# Patient Record
Sex: FEMALE | Race: BLACK OR AFRICAN AMERICAN | NOT HISPANIC OR LATINO | Employment: UNEMPLOYED | URBAN - METROPOLITAN AREA
[De-identification: names, ages, dates, MRNs, and addresses within clinical notes are randomized per-mention and may not be internally consistent; named-entity substitution may affect disease eponyms.]

---
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
## 2024-11-01 ENCOUNTER — OFFICE VISIT (OUTPATIENT)
Dept: OBSTETRICS AND GYNECOLOGY | Facility: CLINIC | Age: 19
End: 2024-11-01

## 2024-11-01 VITALS
SYSTOLIC BLOOD PRESSURE: 122 MMHG | HEIGHT: 63 IN | BODY MASS INDEX: 21.17 KG/M2 | DIASTOLIC BLOOD PRESSURE: 74 MMHG | WEIGHT: 119.5 LBS

## 2024-11-01 DIAGNOSIS — N91.2 AMENORRHEA: Primary | ICD-10-CM

## 2024-11-01 DIAGNOSIS — O21.9 NAUSEA AND VOMITING DURING PREGNANCY: ICD-10-CM

## 2024-11-01 DIAGNOSIS — Z32.01 POSITIVE PREGNANCY TEST: ICD-10-CM

## 2024-11-01 LAB
B-HCG UR QL: POSITIVE
CTP QC/QA: YES

## 2024-11-01 PROCEDURE — 99999 PR PBB SHADOW E&M-EST. PATIENT-LVL III: CPT | Mod: PBBFAC,,, | Performed by: NURSE PRACTITIONER

## 2024-11-01 PROCEDURE — 99213 OFFICE O/P EST LOW 20 MIN: CPT | Mod: PBBFAC | Performed by: NURSE PRACTITIONER

## 2024-11-01 RX ORDER — PRENATAL WITH FERROUS FUM AND FOLIC ACID 3080; 920; 120; 400; 22; 1.84; 3; 20; 10; 1; 12; 200; 27; 25; 2 [IU]/1; [IU]/1; MG/1; [IU]/1; MG/1; MG/1; MG/1; MG/1; MG/1; MG/1; UG/1; MG/1; MG/1; MG/1; MG/1
1 TABLET ORAL DAILY
Qty: 30 TABLET | Refills: 11 | Status: SHIPPED | OUTPATIENT
Start: 2024-11-01 | End: 2025-11-01

## 2024-11-01 RX ORDER — DOXYLAMINE SUCCINATE AND PYRIDOXINE HYDROCHLORIDE, DELAYED RELEASE TABLETS 10 MG/10 MG 10; 10 MG/1; MG/1
2 TABLET, DELAYED RELEASE ORAL NIGHTLY
Qty: 60 TABLET | Refills: 2 | Status: SHIPPED | OUTPATIENT
Start: 2024-11-01 | End: 2025-01-30

## 2024-11-07 ENCOUNTER — PATIENT MESSAGE (OUTPATIENT)
Dept: OBSTETRICS AND GYNECOLOGY | Facility: CLINIC | Age: 19
End: 2024-11-07

## 2024-11-19 ENCOUNTER — CLINICAL SUPPORT (OUTPATIENT)
Dept: OBSTETRICS AND GYNECOLOGY | Facility: CLINIC | Age: 19
End: 2024-11-19
Payer: MEDICAID

## 2024-11-19 DIAGNOSIS — N91.2 AMENORRHEA: Primary | ICD-10-CM

## 2024-11-19 PROCEDURE — 99212 OFFICE O/P EST SF 10 MIN: CPT | Mod: PBBFAC

## 2024-11-19 PROCEDURE — 99999 PR PBB SHADOW E&M-EST. PATIENT-LVL II: CPT | Mod: PBBFAC,,,

## 2024-12-09 ENCOUNTER — INITIAL PRENATAL (OUTPATIENT)
Dept: OBSTETRICS AND GYNECOLOGY | Facility: CLINIC | Age: 19
End: 2024-12-09
Payer: MEDICAID

## 2024-12-09 ENCOUNTER — HOSPITAL ENCOUNTER (OUTPATIENT)
Dept: PERINATAL CARE | Facility: OTHER | Age: 19
Discharge: HOME | End: 2024-12-09
Attending: OBSTETRICS & GYNECOLOGY
Payer: MEDICAID

## 2024-12-09 VITALS
WEIGHT: 110.44 LBS | SYSTOLIC BLOOD PRESSURE: 122 MMHG | DIASTOLIC BLOOD PRESSURE: 80 MMHG | BODY MASS INDEX: 19.57 KG/M2

## 2024-12-09 DIAGNOSIS — N91.2 AMENORRHEA: Primary | ICD-10-CM

## 2024-12-09 DIAGNOSIS — N91.2 AMENORRHEA: ICD-10-CM

## 2024-12-09 DIAGNOSIS — Z11.3 SCREEN FOR STD (SEXUALLY TRANSMITTED DISEASE): ICD-10-CM

## 2024-12-09 DIAGNOSIS — O21.9 NAUSEA AND VOMITING DURING PREGNANCY: ICD-10-CM

## 2024-12-09 DIAGNOSIS — Z32.01 POSITIVE PREGNANCY TEST: ICD-10-CM

## 2024-12-09 PROCEDURE — 76802 OB US < 14 WKS ADDL FETUS: CPT

## 2024-12-09 PROCEDURE — 76810 OB US >/= 14 WKS ADDL FETUS: CPT

## 2024-12-09 PROCEDURE — 87088 URINE BACTERIA CULTURE: CPT

## 2024-12-09 PROCEDURE — 87491 CHLMYD TRACH DNA AMP PROBE: CPT

## 2024-12-09 PROCEDURE — 76802 OB US < 14 WKS ADDL FETUS: CPT | Mod: 26,,, | Performed by: OBSTETRICS & GYNECOLOGY

## 2024-12-09 PROCEDURE — 99999 PR PBB SHADOW E&M-EST. PATIENT-LVL III: CPT | Mod: PBBFAC,,,

## 2024-12-09 PROCEDURE — 76801 OB US < 14 WKS SINGLE FETUS: CPT

## 2024-12-09 PROCEDURE — 99204 OFFICE O/P NEW MOD 45 MIN: CPT | Mod: TH,S$PBB,,

## 2024-12-09 PROCEDURE — 87086 URINE CULTURE/COLONY COUNT: CPT

## 2024-12-09 PROCEDURE — 99213 OFFICE O/P EST LOW 20 MIN: CPT | Mod: PBBFAC,25,TH

## 2024-12-09 PROCEDURE — 76801 OB US < 14 WKS SINGLE FETUS: CPT | Mod: 26,,, | Performed by: OBSTETRICS & GYNECOLOGY

## 2024-12-09 RX ORDER — DOXYLAMINE SUCCINATE AND PYRIDOXINE HYDROCHLORIDE, DELAYED RELEASE TABLETS 10 MG/10 MG 10; 10 MG/1; MG/1
2 TABLET, DELAYED RELEASE ORAL NIGHTLY
Qty: 60 TABLET | Refills: 2 | Status: SHIPPED | OUTPATIENT
Start: 2024-12-09 | End: 2025-03-09

## 2024-12-09 NOTE — PROGRESS NOTES
CC: Positive Pregnancy Test    HISTORY OF PRESENT ILLNESS:    Amena Theodore is a 19 y.o. female, ,  Presents today for a routine exam complaining of amenorrhea and positive home urine pregnancy test.  Patient's last menstrual period was 2024.  Pt reports menses were normal prior to this.  She is not currently on any contraception. Reports nausea- did not start Diclegis , had problems with insurance. Reports breast tenderness. Denies pelvic pain. This is her first pregnancy. Dating ultrasound today- TWINS!     LMP: 24  EGA: 11w4d (per LMP)  EDC: 25 (per LMP)    ROS:  GENERAL: No weight changes. No swelling. No fatigue. No fever.  CARDIOVASCULAR: No chest pain. No shortness of breath. No leg cramps.   NEUROLOGICAL: No headaches. No vision changes.  BREASTS: No pain. No lumps. No discharge.  ABDOMEN: No pain. No diarrhea. No constipation.  REPRODUCTIVE: No abnormal bleeding.   VULVA: No pain. No lesions. No itching.  VAGINA: No relaxation. No itching. No odor. No discharge. No lesions.  URINARY: No incontinence. No nocturia. No frequency. No dysuria.    MEDICATIONS AND ALLERGIES:  Reviewed    COMPREHENSIVE GYN HISTORY:  PAP History: Denies abnormal Paps.  Infection History: Denies STDs. Denies PID.  Benign History: Denies uterine fibroids. Denies ovarian cysts. Denies endometriosis. Denies other conditions.  Cancer History: Denies cervical cancer. Denies uterine cancer or hyperplasia. Denies ovarian cancer. Denies vulvar cancer or pre-cancer. Denies vaginal cancer or pre-cancer. Denies breast cancer. Denies colon cancer.  Sexual Activity History: Reports currently being sexually active  Menstrual History: None.  Contraception: None    /80   Wt 50.1 kg (110 lb 7.2 oz)   LMP 2024   BMI 19.57 kg/m²     PE:  AFFECT: Calm, alert and oriented X 3. Interactive during exam  GENERAL: Appears well-nourished, well-developed, in no acute distress.  HEAD: Normocephalic, atraumatic  TEETH: Good  dentition.  SKIN: Normal for race, warm, & dry. No lesions or rashes.  LUNGS: Easy and unlabored  HEART: Regular rate and rhythm   EXTREMITIES: No cyanosis, clubbing or edema. No calf tenderness.    PROCEDURES:  UPT Positive  Genprobe  Pap not indicated     ASSESSMENT/PLAN:  Amenorrhea  Positive urine pregnancy test (KARLEE: 25, EGA: 11w4d based on LMP)    -  Routine prenatal care    Nausea and vomiting in pregnancy    -  Education regarding lifestyle and dietary modifications    -  Advised use of B6/Unisom. Pt will notify us if no relief/worsening symptoms    1st TRIMESTER COUNSELING: Discussed all, booklet provided:  Common complaints of pregnancy  HIV and other routine prenatal tests including  genetic screening  Risk factors identified by prenatal history  Oriented to practice - discussed anticipated course of prenatal care & indications for Ultrasound  Childbirth classes/Hospital facilities   Nutrition and weight gain counseling  Toxoplasmosis precautions (Cats/Raw Meat)  Sexual activity and exercise  Environmental/Work hazards  Travel  Tobacco (Ask, Advise, Assess, Assist, and Arrange), as well as alcohol and drug use  Use of any medications (Including supplements, Vitamins, Herbs, or OTC Drugs)  Domestic violence  Seat belt use    TERATOLOGY COUNSELING:   Discussed indications and options for aneuploidy screening - pamphlets given    - Pt will let us know     PLAN:  - Dating ultrasound today, in process   - Urine culture sent  - GC/CT collected  - 1T labs ordered   - Pap not indicated   - Resent Diclegis to [facility] pharmacy     FOLLOW-UP in 4 weeks with Dr. Ranger Jo Ann Casey, NP    OB/GYN

## 2024-12-09 NOTE — PATIENT INSTRUCTIONS
1) Eat small frequent meals through the day versus three large meals  2) Try ginger ale or sprite to help settle the stomach   3) Eat crackers or dry toast before getting out of bed in the morning   4) Stay hydrated by drinking plenty of water-do not immediately eat or drink something after vomiting. Give your stomach a rest for 20-30 minutes. Slowly reintroduce ice chips, small sips water, crackers, etc.    5) Try OTC Unisom (use the tablets that have doxylamine not diphenhydramine in them, can use generic brands like Equate) and vitamin B6  (25 mg, can find on [employer]) together at night before bed. This can help with the nausea in the morning and is safe to use during pregnancy. You can also take the vitamin B6 alone, every 8 hours to help with the nausea.     If you are unable to keep anything down and constantly vomiting for more that 24 hours, let the office know so that dehydration can be avoided. We would recommend being seen in the emergency department if this is the case.      Call [phone] to see about coverage and any out of pocket costs regarding the [id number] (MT21) testing. This is done any day after 10 weeks and is blood work only. It checks for any chromosomal abnormalities like Down Syndrome. You can also find out the sex of the baby if you choose to know. Once you find out coverage and decide to proceed, send either myself or your doctor a message and we can see what date you can do it. It is done at our second floor lab at [facility].      Call clinic [phone] or L & D after hours at [phone]

## 2024-12-10 DIAGNOSIS — O30.039 MONOCHORIONIC DIAMNIOTIC TWIN PREGNANCY, ANTEPARTUM: Primary | ICD-10-CM

## 2024-12-11 ENCOUNTER — TELEPHONE (OUTPATIENT)
Dept: MATERNAL FETAL MEDICINE | Facility: CLINIC | Age: 19
End: 2024-12-11
Payer: MEDICAID

## 2024-12-11 ENCOUNTER — PATIENT MESSAGE (OUTPATIENT)
Dept: MATERNAL FETAL MEDICINE | Facility: CLINIC | Age: 19
End: 2024-12-11
Payer: MEDICAID

## 2024-12-11 LAB — BACTERIA UR CULT: ABNORMAL

## 2024-12-13 LAB
C TRACH DNA SPEC QL NAA+PROBE: NOT DETECTED
N GONORRHOEA DNA SPEC QL NAA+PROBE: NOT DETECTED

## 2024-12-17 ENCOUNTER — PATIENT MESSAGE (OUTPATIENT)
Dept: MATERNAL FETAL MEDICINE | Facility: CLINIC | Age: 19
End: 2024-12-17
Payer: MEDICAID

## 2025-01-14 ENCOUNTER — LAB VISIT (OUTPATIENT)
Dept: LAB | Facility: OTHER | Age: 20
End: 2025-01-14
Attending: STUDENT IN AN ORGANIZED HEALTH CARE EDUCATION/TRAINING PROGRAM
Payer: MEDICAID

## 2025-01-14 ENCOUNTER — ROUTINE PRENATAL (OUTPATIENT)
Dept: OBSTETRICS AND GYNECOLOGY | Facility: CLINIC | Age: 20
End: 2025-01-14
Payer: MEDICAID

## 2025-01-14 VITALS
WEIGHT: 108.69 LBS | DIASTOLIC BLOOD PRESSURE: 80 MMHG | BODY MASS INDEX: 19.25 KG/M2 | SYSTOLIC BLOOD PRESSURE: 126 MMHG

## 2025-01-14 DIAGNOSIS — Z36.9 ENCOUNTER FOR FETAL ULTRASOUND: Primary | ICD-10-CM

## 2025-01-14 DIAGNOSIS — O30.039 MONOCHORIONIC DIAMNIOTIC TWIN PREGNANCY, ANTEPARTUM: Primary | ICD-10-CM

## 2025-01-14 DIAGNOSIS — Z32.01 POSITIVE PREGNANCY TEST: ICD-10-CM

## 2025-01-14 DIAGNOSIS — O30.039 MONOCHORIONIC DIAMNIOTIC TWIN PREGNANCY, ANTEPARTUM: ICD-10-CM

## 2025-01-14 DIAGNOSIS — N91.2 AMENORRHEA: ICD-10-CM

## 2025-01-14 PROCEDURE — 99999 PR PBB SHADOW E&M-EST. PATIENT-LVL III: CPT | Mod: PBBFAC,,, | Performed by: STUDENT IN AN ORGANIZED HEALTH CARE EDUCATION/TRAINING PROGRAM

## 2025-01-14 PROCEDURE — 99213 OFFICE O/P EST LOW 20 MIN: CPT | Mod: TH,S$PBB,, | Performed by: STUDENT IN AN ORGANIZED HEALTH CARE EDUCATION/TRAINING PROGRAM

## 2025-01-14 PROCEDURE — 99213 OFFICE O/P EST LOW 20 MIN: CPT | Mod: PBBFAC,TH | Performed by: STUDENT IN AN ORGANIZED HEALTH CARE EDUCATION/TRAINING PROGRAM

## 2025-01-14 PROCEDURE — 36415 COLL VENOUS BLD VENIPUNCTURE: CPT | Performed by: STUDENT IN AN ORGANIZED HEALTH CARE EDUCATION/TRAINING PROGRAM

## 2025-01-14 RX ORDER — VITAMIN A ACETATE, ASCORBIC ACID, CHOLECALCIFEROL, ALPHA-TOCOPHEROL ACETATE, DL, THIAMINE MONONITRATE, RIBOFLAVIN, NIACINAMIDE, PYRIDOXINE HYDROCHLORIDE, FOLIC ACID, CYANOCOBALAMIN, BIOTIN, CALCIUM PANTOTHENATE, CALCIUM CARBONATE, FERROUS FUMARATE, POTASSIUM IODIDE, MAGNESIUM OXIDE, ZINC OXIDE AND CUPRIC OXIDE 2500; 80; 400; 10; 3; 3.4; 20; 20; 1; 12; 300; 6; 120; 27; 150; 30; 15; 2 [IU]/1; MG/1; [IU]/1; [IU]/1; MG/1; MG/1; MG/1; MG/1; MG/1; UG/1; UG/1; MG/1; MG/1; MG/1; UG/1; UG/1; UG/1; MG/1
1 TABLET, FILM COATED ORAL
COMMUNITY
Start: 2024-11-22 | End: 2025-01-24 | Stop reason: ALTCHOICE

## 2025-01-14 RX ORDER — ASPIRIN 81 MG/1
81 TABLET ORAL DAILY
COMMUNITY
Start: 2025-01-14 | End: 2025-01-24

## 2025-01-14 RX ORDER — PRENATAL WITH FERROUS FUM AND FOLIC ACID 3080; 920; 120; 400; 22; 1.84; 3; 20; 10; 1; 12; 200; 27; 25; 2 [IU]/1; [IU]/1; MG/1; [IU]/1; MG/1; MG/1; MG/1; MG/1; MG/1; MG/1; UG/1; MG/1; MG/1; MG/1; MG/1
1 TABLET ORAL DAILY
Qty: 90 TABLET | Refills: 3 | Status: SHIPPED | OUTPATIENT
Start: 2025-01-14 | End: 2025-01-24 | Stop reason: SDUPTHER

## 2025-01-14 NOTE — PROGRESS NOTES
Pt is here for initial OB. Feeling well today. Nervous.   N/V has improved. No bleeding or pain.  FOB, Quaran, and two of her cousins are here today  MFM has not gotten her to schedule US yet. Will have her turn her blocking feature off. Reviewed with her that she will need frequent US and visits. Also reviewed that CS is a higher likelihood in mono/di twins. Pt is interested in the gender. Wants M21 today for a gender reveal    -Reviewed routine PNC  -Reviewed newmom, connected mom  -Reviewed initial labs, ultrasound  -Reviewed common pregnancy complaints and comfort measures for them  -Genetic testing [id number]  -ASA recommended to start after 12 weeks due to twins  -RTC 4 weeks    Mitzi Nava M.D.

## 2025-01-14 NOTE — PATIENT INSTRUCTIONS
[email or web address]    Nausea and vomiting  Vitamin B6 (pyridoxine) 50-100mg orally with Doxylamine (unisom) 12.5mg orally every 6-8 hours as needed for nausea. If the fatigue is too bad, you can try just the B6. Lastly, if taking it as needed is not helping, consider taking the two together on a scheduled basis.    Medications  Avoid NSAIDs (aleve, motrin, ibuprofen)  Use Tylenol or Acetaminophen for aches/pains, headaches  Begin a baby aspirin once daily (81mg) after 12 weeks -this has been shown to help decrease the chance of blood pressure problems in pregnancy (and twins makes us more prone to this)  Pecid up to twice a day  GasX or simethicone for gas pains  Colace for constipation  Medications that are pregnancy category A, B or C are accepted as safe during pregnancy    Genetic Testing  [id number]: best test we have, most sensitive. Tells gender. Call Toll Free to get survey to decrease cost [phone]. website has a video about the test "[employer]"/videos    Caffiene  Less than 200mg per day    Exercise  Listen to your body  Don't stay with heart rate >140bpm    Weight Gain  -- Recommended weight gain of:          Underweight        Less than 18.5            28-40            Normal Weight     18.5-24.9                    25-35            Overweight          25-29.9                       15-25            Obese                  30 and greater             11-20      Covid  [email or web address]://[email or web address]/[email or web address]-health/faqs/[email or web address]-covid-[email or web address]-[email or web address]-breastfeeding    [email or web address]://[email or web address].org/womens-health/experts-and-stories/the-latest/lmv-la-fo-know-the-covid-19-vaccines-are-safe-and-effective-one-expert-explains    [email or web address]        Dear Amena Theodore,    Congratulations! [facility] is excited for the upcoming addition to your family and is ready to support you over the course of your pregnancy. One of the ways we are  prepared to help you is through our Connected MOM program.     Connected MOM is offered at no charge to help you manage your pregnancy by staying connected with your OB team through digitally connected devices. With Connected MOM, you will be able to digitally send weights and blood pressure readings to your provider and their team from the comfort of work or home without needing to schedule an appointment. Patients who participate in Connected MOM may be able to reduce the number of in-office appointments needed.     To participate, click here to complete the Connected Mom Program Consent questionnaire to start the enrollment process.    Once youve completed the questionnaire, you will be able to select how youd like to obtain your Connected Mom equipment - a digital blood pressure cuff and scale. These can be shipped directly to your home or picked up at an [facility].            If you have any questions about the program, please contact your OB provider or the Connected MOM support team at [phone].    Thank you,  The Connected MOM Team

## 2025-01-16 ENCOUNTER — PATIENT MESSAGE (OUTPATIENT)
Dept: OTHER | Facility: OTHER | Age: 20
End: 2025-01-16
Payer: MEDICAID

## 2025-01-20 ENCOUNTER — PATIENT MESSAGE (OUTPATIENT)
Dept: MATERNAL FETAL MEDICINE | Facility: CLINIC | Age: 20
End: 2025-01-20
Payer: MEDICAID

## 2025-01-23 LAB
ABOUT THE TEST: NORMAL
APPROVED BY: NORMAL
FETAL FRACTION: NORMAL
FETAL SEX RESULT: NORMAL
GESTATIONAL AGE > 9: YES
GESTATIONAL AGE: NORMAL
LAB DIRECTOR COMMENTS: NORMAL
LIMITATIONS:: NORMAL
MONOSOMY X RESULT: NORMAL
NEGATIVE PREDICTIVE VALUE: NORMAL
NOTE: NORMAL
PERFORMANCE CHARACTERISTICS: NORMAL
PERFORMANCE: NORMAL
POSITIVE PREDICTIVE VALUE: NORMAL
RESULT: NEGATIVE
SERVICE CMNT 04-IMP: NORMAL
TEST METHODOLOGY:: NORMAL
TRISOMY 13 (T13): NEGATIVE
TRISOMY 18: NEGATIVE
TRISOMY 21 (T21): NEGATIVE
XXX (TRIPLE X SYNDROME): NORMAL
XXY (KLINEFELTER SYNDROME): NORMAL
XYY (JACOBS SYNDROME): NORMAL

## 2025-01-24 ENCOUNTER — PROCEDURE VISIT (OUTPATIENT)
Dept: MATERNAL FETAL MEDICINE | Facility: CLINIC | Age: 20
End: 2025-01-24
Payer: MEDICAID

## 2025-01-24 ENCOUNTER — OFFICE VISIT (OUTPATIENT)
Dept: MATERNAL FETAL MEDICINE | Facility: CLINIC | Age: 20
End: 2025-01-24
Payer: MEDICAID

## 2025-01-24 VITALS
SYSTOLIC BLOOD PRESSURE: 114 MMHG | BODY MASS INDEX: 19.34 KG/M2 | WEIGHT: 109.13 LBS | DIASTOLIC BLOOD PRESSURE: 76 MMHG | HEIGHT: 63 IN

## 2025-01-24 DIAGNOSIS — O30.039 MONOCHORIONIC DIAMNIOTIC TWIN PREGNANCY, ANTEPARTUM: ICD-10-CM

## 2025-01-24 DIAGNOSIS — O30.032 MONOCHORIONIC DIAMNIOTIC TWIN GESTATION IN SECOND TRIMESTER: Primary | ICD-10-CM

## 2025-01-24 DIAGNOSIS — N91.2 AMENORRHEA: ICD-10-CM

## 2025-01-24 DIAGNOSIS — Z36.9 ENCOUNTER FOR FETAL ULTRASOUND: ICD-10-CM

## 2025-01-24 DIAGNOSIS — O36.5922 INTRAUTERINE GROWTH RESTRICTION, ANTEPARTUM, SECOND TRIMESTER, FETUS 2: ICD-10-CM

## 2025-01-24 DIAGNOSIS — O30.039 MONOCHORIONIC DIAMNIOTIC TWIN PREGNANCY, ANTEPARTUM: Primary | ICD-10-CM

## 2025-01-24 DIAGNOSIS — Z32.01 POSITIVE PREGNANCY TEST: ICD-10-CM

## 2025-01-24 PROCEDURE — 99213 OFFICE O/P EST LOW 20 MIN: CPT | Mod: PBBFAC,TH | Performed by: OBSTETRICS & GYNECOLOGY

## 2025-01-24 PROCEDURE — 3008F BODY MASS INDEX DOCD: CPT | Mod: CPTII,,, | Performed by: OBSTETRICS & GYNECOLOGY

## 2025-01-24 PROCEDURE — 99999 PR PBB SHADOW E&M-EST. PATIENT-LVL III: CPT | Mod: PBBFAC,,, | Performed by: OBSTETRICS & GYNECOLOGY

## 2025-01-24 PROCEDURE — 1160F RVW MEDS BY RX/DR IN RCRD: CPT | Mod: CPTII,,, | Performed by: OBSTETRICS & GYNECOLOGY

## 2025-01-24 PROCEDURE — 76810 OB US >/= 14 WKS ADDL FETUS: CPT | Mod: 26,S$PBB,, | Performed by: OBSTETRICS & GYNECOLOGY

## 2025-01-24 PROCEDURE — 76805 OB US >/= 14 WKS SNGL FETUS: CPT | Mod: PBBFAC | Performed by: OBSTETRICS & GYNECOLOGY

## 2025-01-24 PROCEDURE — 1159F MED LIST DOCD IN RCRD: CPT | Mod: CPTII,,, | Performed by: OBSTETRICS & GYNECOLOGY

## 2025-01-24 PROCEDURE — 3074F SYST BP LT 130 MM HG: CPT | Mod: CPTII,,, | Performed by: OBSTETRICS & GYNECOLOGY

## 2025-01-24 PROCEDURE — 99215 OFFICE O/P EST HI 40 MIN: CPT | Mod: S$PBB,TH,, | Performed by: OBSTETRICS & GYNECOLOGY

## 2025-01-24 PROCEDURE — 3078F DIAST BP <80 MM HG: CPT | Mod: CPTII,,, | Performed by: OBSTETRICS & GYNECOLOGY

## 2025-01-24 RX ORDER — PRENATAL WITH FERROUS FUM AND FOLIC ACID 3080; 920; 120; 400; 22; 1.84; 3; 20; 10; 1; 12; 200; 27; 25; 2 [IU]/1; [IU]/1; MG/1; [IU]/1; MG/1; MG/1; MG/1; MG/1; MG/1; MG/1; UG/1; MG/1; MG/1; MG/1; MG/1
1 TABLET ORAL DAILY
Qty: 90 TABLET | Refills: 3 | Status: SHIPPED | OUTPATIENT
Start: 2025-01-24 | End: 2026-01-24

## 2025-01-24 RX ORDER — ASPIRIN 81 MG/1
81 TABLET ORAL DAILY
Qty: 90 TABLET | Refills: 3 | Status: SHIPPED | OUTPATIENT
Start: 2025-01-24 | End: 2026-01-19

## 2025-01-24 NOTE — PROGRESS NOTES
MATERNAL-FETAL MEDICINE   CONSULT NOTE    Provider requesting consultation: Dr. Nava    SUBJECTIVE:     Ms. Amena Theodore is a 19 y.o.  female with IUP at 18w1d who is seen in consultation by MFM for evaluation and management of:  Problem   Monochorionic Diamniotic Twin Gestation in Second Trimester   Intrauterine Growth Restriction, Antepartum, Second Trimester, Fetus 2            Medication List with Changes/Refills   Changed and/or Refilled Medications    Modified Medication Previous Medication    ASPIRIN (ECOTRIN) 81 MG EC TABLET aspirin (ECOTRIN) 81 MG EC tablet       Take 1 tablet (81 mg total) by mouth once daily.    Take 1 tablet (81 mg total) by mouth once daily.    PNV,CALCIUM 72/IRON/FOLIC ACID (PRENATAL VITAMIN) TAB PNV,calcium 72/iron/folic acid (PRENATAL VITAMIN) Tab       Take 1 tablet by mouth once daily.    Take 1 tablet by mouth once daily.   Discontinued Medications    ASPIRIN (ECOTRIN) 81 MG EC TABLET    Take 1 tablet (81 mg total) by mouth once daily.    DOXYLAMINE-PYRIDOXINE, VIT B6, (DICLEGIS) 10-10 MG TBEC    Take 2 tablets by mouth every evening.    PNV-SELECT 27-1 MG TAB    Take 1 tablet by mouth.       Review of patient's allergies indicates:  No Known Allergies    PMH:History reviewed. No pertinent past medical history.    PObHx:  OB History    Para Term  AB Living   1             SAB IAB Ectopic Multiple Live Births                  # Outcome Date GA Lbr Juan José/2nd Weight Sex Type Anes PTL Lv   1 Current                PSH:History reviewed. No pertinent surgical history.    Family history:family history includes Diabetes in her maternal aunt.    Social history: reports that she has never smoked. She has never used smokeless tobacco. She reports that she does not currently use alcohol. She reports that she does not currently use drugs.    Genetic history: The patient denies any inherited genetic diseases or birth defects in herself or her partner's personal history or  "family.    Objective:   /76 (BP Location: Left arm, Patient Position: Sitting)   Ht 5' 3" (1.6 m)   Wt 49.5 kg (109 lb 2 oz)   LMP 2024   BMI 19.33 kg/m²     Ultrasound performed. See viewpoint for full ultrasound report.  1. Monochorionic-diamniotic twin pregnancy   2. Twin A  - Fetal size is consistent with established dates. AC is at the 63%.  - R kidney is not visualized. Otherwise normal early limited fetal anatomic survey.   - Bladder is visualized.  - MVP is normal amount.   - Umbilical artery end diastolic flow is normal.  3. Twin B  - Fetal size lags established dates by 10 days.  AC is at the 7%. Growth discordance is 26%. Findings are consistent with early diagnosis of selective fetal growth restriction type III.  - Normal early limited fetal anatomic survey.   - Bladder is visualized.  - MVP is normal amount.   - Umbilical artery end diastolic flow is intermittently absent.  4. There are no signs of TTTS.     Significant labs/imaging:  Cell free DNA: low risk    ASSESSMENT/PLAN:     19 y.o.  female with IUP at 18w1d     Intrauterine growth restriction, antepartum, second trimester, fetus 2  Early onset selective FGR type 3 of twin B with biometry lag of 10 days, AC 7%, discordance of 26% and UA Dopplers demonstrating intermittent absent end diastolic velocity     While growth restriction outside of twin pregnancy is generally defined is either an EFW or abdominal circumference less than the 10th percentile, in monozygotic twin pregnancies the diagnosis can be established based simply on discordance in growth. Implications of selective fetal growth restriction can be dire. Germain et. al. have reported on a scoring criteria based on umbilical artery Doppler flow in the smaller twin, with type 1 showing persistent forward flow, type 2 showing persistent absent or reversed end-diastolic flow and type 3 showing intermittent absent or reversed end-diastolic flow.  Far away the highest " risk of adverse outcomes are among those with type 2, with rates of deterioration at 90% and fetal demise in the 20-30% range.  Outcomes for sFGR type 3 a comparatively better but still striking, with rates of in utero deterioration of 11% and rates of fetal demise up to 15%.  No increased risk for fetal demise is observed for sFGR type 1. Delivery timing is informed by Doppler findings, with absent or reversed velocity requiring delivery at 30-32 weeks, while type 1 can continue through 34-36 weeks.   Concern for the well-being of the smaller twin are not limited to its own survival.  Among the surviving twin, outcomes are linked to a high incidence (24-45%) of neurologic injury, likely from acute pressure and volume shifts from the living twin to the  twin through patent vascular channels. Options for management included expectant, selective reduction and fetoscopic laser.    Recommendations:  1. The patient opted for expectant management at this time.  - Referral to fetal intervention center in [place] for consultation and potential intervention was discussed with Amena and Jordi, they defer at this time  2. Weekly ultrasound assessments, including UA and MCA Dopplers for each twin (Will be in PNT next week and then in fetal clinic on )  3. Due to the association of cardiac disease with Monochorionic twin pregnancy, recommend a fetal echocardiogram at 22 weeks gestation.  4. Fetal growth assessment every 3 weeks.  5. Timing of delivery will be determined by multiple factors including interval growth.    Monochorionic diamniotic twin gestation in second trimester  With a monochorionic-diamniotic pregnancy, Amena is at risk for a variety of complications, including increased fetal risk of congenital anomalies, demise with or without concurrent injury or death to second twin and growth restriction, both selective and non-selective. Complications unique to monochorionic-diamniotic pregnancy include  twin-twin transfusion syndrome (10-15% of mo-di pregnancies) & twin anemia-polycythemia sequence.  delivery is likely, either iatrogenic or spontaneous. Additional risks include gestational diabetes and preeclampsia.    Recommendations:  1. ASA 81 mg daily (Rx to pharmacy)  2. Folic acid 1 mg daily  3. Ferrous sulfate 325 mg every other day or daily (each is equally effective, but every other daily dosing can challenge adherence)  4. Establish baseline preeclampsia labs with urine protein assessment along with baseline serum creatinine, platelets and transaminases  5. Limited US every 2 weeks from 16 weeks - 30 weeks to evaluate for TTTS (will be more frequent based on sFGR)  6. Targeted midtrimester congenital anomaly ultrasound  7. Fetal echo at 22-24 weeks  8. Glucose screen at 24-26 weeks  9. Vaginal delivery should be considered so long as presenting twin is cephalic and growth is normal      FOLLOW UP:   F/u in 2 weeks for US/MFM visit  F/U next week for UA/MCA Dopplers    60 minutes of total time spent on the encounter, which includes face to face time and non-face to face time preparing to see the patient (eg, review of tests), obtaining and/or reviewing separately obtained history, documenting clinical information in the electronic or other health record, independently interpreting results (not separately reported) and communicating results to the patient/family/caregiver, or care coordination (not separately reported).      Gelacio Beasley III  Maternal-Fetal Medicine    Electronically Signed by Gelacio Beasley III 2025

## 2025-01-24 NOTE — ASSESSMENT & PLAN NOTE
With a monochorionic-diamniotic pregnancy, Amena is at risk for a variety of complications, including increased fetal risk of congenital anomalies, demise with or without concurrent injury or death to second twin and growth restriction, both selective and non-selective. Complications unique to monochorionic-diamniotic pregnancy include twin-twin transfusion syndrome (10-15% of mo-di pregnancies) & twin anemia-polycythemia sequence.  delivery is likely, either iatrogenic or spontaneous. Additional risks include gestational diabetes and preeclampsia.    Recommendations:  1. ASA 81 mg daily (Rx to pharmacy)  2. Folic acid 1 mg daily  3. Ferrous sulfate 325 mg every other day or daily (each is equally effective, but every other daily dosing can challenge adherence)  4. Establish baseline preeclampsia labs with urine protein assessment along with baseline serum creatinine, platelets and transaminases  5. Limited US every 2 weeks from 16 weeks - 30 weeks to evaluate for TTTS (will be more frequent based on sFGR)  6. Targeted midtrimester congenital anomaly ultrasound  7. Fetal echo at 22-24 weeks  8. Glucose screen at 24-26 weeks  9. Vaginal delivery should be considered so long as presenting twin is cephalic and growth is normal

## 2025-01-24 NOTE — ASSESSMENT & PLAN NOTE
Early onset selective FGR type 3 of twin B with biometry lag of 10 days, AC 7%, discordance of 26% and UA Dopplers demonstrating intermittent absent end diastolic velocity     While growth restriction outside of twin pregnancy is generally defined is either an EFW or abdominal circumference less than the 10th percentile, in monozygotic twin pregnancies the diagnosis can be established based simply on discordance in growth. Implications of selective fetal growth restriction can be dire. Germain et. al. have reported on a scoring criteria based on umbilical artery Doppler flow in the smaller twin, with type 1 showing persistent forward flow, type 2 showing persistent absent or reversed end-diastolic flow and type 3 showing intermittent absent or reversed end-diastolic flow.  Far away the highest risk of adverse outcomes are among those with type 2, with rates of deterioration at 90% and fetal demise in the 20-30% range.  Outcomes for sFGR type 3 a comparatively better but still striking, with rates of in utero deterioration of 11% and rates of fetal demise up to 15%.  No increased risk for fetal demise is observed for sFGR type 1. Delivery timing is informed by Doppler findings, with absent or reversed velocity requiring delivery at 30-32 weeks, while type 1 can continue through 34-36 weeks.   Concern for the well-being of the smaller twin are not limited to its own survival.  Among the surviving twin, outcomes are linked to a high incidence (24-45%) of neurologic injury, likely from acute pressure and volume shifts from the living twin to the  twin through patent vascular channels. Options for management included expectant, selective reduction and fetoscopic laser.    Recommendations:  1. The patient opted for expectant management at this time.  - Referral to fetal intervention center in [place] for consultation and potential intervention was discussed with Sathya, they defer at this time  2. Weekly  ultrasound assessments, including UA and MCA Dopplers for each twin (Will be in PNT next week and then in fetal clinic on 2/5)  3. Due to the association of cardiac disease with Monochorionic twin pregnancy, recommend a fetal echocardiogram at 22 weeks gestation.  4. Fetal growth assessment every 3 weeks.  5. Timing of delivery will be determined by multiple factors including interval growth.

## 2025-01-25 DIAGNOSIS — O30.039 MONOCHORIONIC DIAMNIOTIC TWIN PREGNANCY, ANTEPARTUM: Primary | ICD-10-CM

## 2025-01-25 DIAGNOSIS — O36.5912: ICD-10-CM

## 2025-01-28 ENCOUNTER — HOSPITAL ENCOUNTER (OUTPATIENT)
Dept: PERINATAL CARE | Facility: OTHER | Age: 20
Discharge: HOME | End: 2025-01-28
Attending: STUDENT IN AN ORGANIZED HEALTH CARE EDUCATION/TRAINING PROGRAM
Payer: MEDICAID

## 2025-01-28 DIAGNOSIS — O30.039 MONOCHORIONIC DIAMNIOTIC TWIN PREGNANCY, ANTEPARTUM: ICD-10-CM

## 2025-01-28 PROCEDURE — 76820 UMBILICAL ARTERY ECHO: CPT

## 2025-02-05 ENCOUNTER — PATIENT MESSAGE (OUTPATIENT)
Dept: PEDIATRIC CARDIOLOGY | Facility: CLINIC | Age: 20
End: 2025-02-05
Payer: MEDICAID

## 2025-02-05 ENCOUNTER — OFFICE VISIT (OUTPATIENT)
Dept: MATERNAL FETAL MEDICINE | Facility: CLINIC | Age: 20
End: 2025-02-05
Payer: MEDICAID

## 2025-02-05 ENCOUNTER — PROCEDURE VISIT (OUTPATIENT)
Dept: MATERNAL FETAL MEDICINE | Facility: CLINIC | Age: 20
End: 2025-02-05
Payer: MEDICAID

## 2025-02-05 VITALS
SYSTOLIC BLOOD PRESSURE: 121 MMHG | BODY MASS INDEX: 19.74 KG/M2 | WEIGHT: 111.44 LBS | DIASTOLIC BLOOD PRESSURE: 70 MMHG

## 2025-02-05 DIAGNOSIS — O30.039 MONOCHORIONIC DIAMNIOTIC TWIN PREGNANCY, ANTEPARTUM: ICD-10-CM

## 2025-02-05 DIAGNOSIS — O36.5922 INTRAUTERINE GROWTH RESTRICTION, ANTEPARTUM, SECOND TRIMESTER, FETUS 2: ICD-10-CM

## 2025-02-05 DIAGNOSIS — Z36.89 ENCOUNTER FOR ULTRASOUND TO ASSESS FETAL GROWTH: ICD-10-CM

## 2025-02-05 DIAGNOSIS — O30.032 MONOCHORIONIC DIAMNIOTIC TWIN GESTATION IN SECOND TRIMESTER: Primary | ICD-10-CM

## 2025-02-05 PROCEDURE — 76812 OB US DETAILED ADDL FETUS: CPT | Mod: 26,S$PBB,, | Performed by: OBSTETRICS & GYNECOLOGY

## 2025-02-05 PROCEDURE — 99999 PR PBB SHADOW E&M-EST. PATIENT-LVL II: CPT | Mod: PBBFAC,,, | Performed by: OBSTETRICS & GYNECOLOGY

## 2025-02-05 PROCEDURE — 99214 OFFICE O/P EST MOD 30 MIN: CPT | Mod: S$PBB,TH,, | Performed by: OBSTETRICS & GYNECOLOGY

## 2025-02-05 PROCEDURE — 3078F DIAST BP <80 MM HG: CPT | Mod: CPTII,,, | Performed by: OBSTETRICS & GYNECOLOGY

## 2025-02-05 PROCEDURE — 3074F SYST BP LT 130 MM HG: CPT | Mod: CPTII,,, | Performed by: OBSTETRICS & GYNECOLOGY

## 2025-02-05 PROCEDURE — 99212 OFFICE O/P EST SF 10 MIN: CPT | Mod: PBBFAC,TH | Performed by: OBSTETRICS & GYNECOLOGY

## 2025-02-05 PROCEDURE — 76811 OB US DETAILED SNGL FETUS: CPT | Mod: PBBFAC | Performed by: OBSTETRICS & GYNECOLOGY

## 2025-02-05 PROCEDURE — 76821 MIDDLE CEREBRAL ARTERY ECHO: CPT | Mod: 59,PBBFAC | Performed by: OBSTETRICS & GYNECOLOGY

## 2025-02-05 PROCEDURE — 1159F MED LIST DOCD IN RCRD: CPT | Mod: CPTII,,, | Performed by: OBSTETRICS & GYNECOLOGY

## 2025-02-05 PROCEDURE — 76811 OB US DETAILED SNGL FETUS: CPT | Mod: 26,S$PBB,, | Performed by: OBSTETRICS & GYNECOLOGY

## 2025-02-05 PROCEDURE — 76812 OB US DETAILED ADDL FETUS: CPT | Mod: PBBFAC | Performed by: OBSTETRICS & GYNECOLOGY

## 2025-02-05 PROCEDURE — 3008F BODY MASS INDEX DOCD: CPT | Mod: CPTII,,, | Performed by: OBSTETRICS & GYNECOLOGY

## 2025-02-05 PROCEDURE — 76821 MIDDLE CEREBRAL ARTERY ECHO: CPT | Mod: 26,S$PBB,, | Performed by: OBSTETRICS & GYNECOLOGY

## 2025-02-05 PROCEDURE — 76820 UMBILICAL ARTERY ECHO: CPT | Mod: 26,59,S$PBB, | Performed by: OBSTETRICS & GYNECOLOGY

## 2025-02-05 PROCEDURE — 76820 UMBILICAL ARTERY ECHO: CPT | Mod: PBBFAC | Performed by: OBSTETRICS & GYNECOLOGY

## 2025-02-05 NOTE — PROGRESS NOTES
Maternal Fetal Medicine follow up consult    SUBJECTIVE:     Amena Theodore is a 19 y.o.  female with IUP at 19w6d who is seen in follow up consultation by MFM.  Pregnancy complications include:   Problem   Monochorionic Diamniotic Twin Gestation in Second Trimester   Intrauterine Growth Restriction, Antepartum, Second Trimester, Fetus 2     Previous notes reviewed.   No changes to medical, surgical, family, social, or obstetric history.    Interval history since last MFM visit: No major changes. She feels well without complaints.    Medications reviewed.    Care team members:  Dr. Nava - Primary OB     OBJECTIVE:   /70 (BP Location: Left arm, Patient Position: Sitting)   Wt 50.5 kg (111 lb 7.1 oz)   LMP 2024   BMI 19.74 kg/m²     Ultrasound performed. See viewpoint for full ultrasound report.  1. Monochorionic-diamniotic twin pregnancy with cardiac activity x 2  2. Twin A:  -A detailed fetal anatomic ultrasound examination was performed for the following high risk indication: mono-di twin gestation.   -Absent right kidney is noted.  -No other fetal structural malformations are identified today.  -Bladder is visualized.  -Normal MVP  -UAD are normal with persistent forward flow (S/D  54%)  -MCA dopplers are normal at 0.94 MoM  -Presentation is cephalic right lower  2. Twin B :  -No fetal structural malformations are identified; however, fetal imaging is incomplete today.   -Bladder is visualized.  -Normal MVP.  -UAD show intermittent absent end diastolic flow.  -MCA dopplers are normal at 1.01 MoM  -Presentation is breech left upper.  3. There are no signs of TTTS.   4. Transvaginal cervical length is normal.   5. Placental location is anterior without evidence of previa.      ASSESSMENT/PLAN:     19 y.o.  female with IUP at 19w6d    Monochorionic diamniotic twin gestation in second trimester  Please see original consult for full counseling and recommendations   We also discussed that CHILOA  twins have higher rates of congenital anomalies, including heart defects. Today, anatomy for both twins is normal. Fetal echos need to be completed.    Recommendations:  ASA 81 mg daily  Folic acid 1 mg daily  Ferrous sulfate 325 mg every other day or daily (each is equally effective, but every other daily dosing can challenge adherence)  Continue Limited US every week until further notice (will be more frequent based on sFGR)  Fetal echo at 22-24 weeks (ordered today)  Given the increased risks of a twin pregnancy, prenatal visits every 2-3 weeks from 24 - 32/34 weeks and weekly prenatal visits thereafter  Mode of delivery is dependent on fetal presentation and other factors    Delivery timing: History of TTTS, FGR with abnormal UA Doppler, oligohydramnios, or comorbid conditions: 32 0/7 - 34 6/7 weeks gestation. However, exact timing recommendations will be made at a later time.      Intrauterine growth restriction, antepartum, second trimester, fetus 2  Early onset selective FGR type 3 of twin B with biometry lag of 10 days, AC 7%, discordance of 26% and UA Dopplers again today demonstrating intermittent absent end diastolic velocity.  We did not remeasure the infants as it has only been 12 days since her last growth.  I have reviewed the ultrasound findings with the patient.  I have reviewed monochorionic placentation, including vascular connections and territorial sharing, and complications associated with it including TTTS, TAPS, and sFGR.  I informed the patient that survival after loss of a co-twin is linked to a high incidence (24-45%) of neurologic injury, likely from acute pressure and volume shifts from the living twin to the  twin through patent vascular channels.  She has previously been counseled on management options including expectant, termination of pregnancy, selective reduction, and fetoscopic laser. She continues to opt for expectant management at this time.    Recommendations:  The  patient opted for expectant management at this time.  Referral to fetal intervention center in [place] for consultation and potential intervention was discussed with Amena and Jordi, they defer at this time  Weekly ultrasound assessments, including UA and MCA Dopplers for each twin  Due to the association of cardiac disease with Monochorionic twin pregnancy, recommend a fetal echocardiogram at 22 weeks gestation.  Fetal growth assessment every 3 weeks - next scheduled later next week (with MD visit)  Timing of delivery will be determined by multiple factors including interval growth at a later time in gestation.  Depending on growth at follow up visits we will discuss the possibility of inpatient admission vs outpatient steroids.      F/u in 1 weeks for MFM visit  F/u in 1 weeks for US    Brenda Hebert PGY5  Maternal Fetal Medicine Fellow      ATTENDING ATTESTATION  I have seen the patient and reviewed Dr. Hebert's consultation note, assessment and plan. I have personally spoken to and discussed plan with the patient and agree with the findings. All changes made to the body of the note.    Patient was counseled that prenatal ultrasound studies have limitations. They do not detect all fetal, genetic, placental, and maternal abnormalities. A normal appearing prenatal ultrasound is reassuring. However, it does not guarantee the absence of an abnormality or predict a normal outcome for the fetus or the mother.   The patient was given an opportunity to ask questions about the management of her high risk pregnancy problems. She expressed an understanding of and agreement to the above impression and plan. All questions were answered to her satisfaction.    30 minutes of total time spent on the encounter, which includes face to face time and non-face to face time preparing to see the patient (eg, review of tests), obtaining and/or reviewing separately obtained history, documenting clinical information in the electronic or  other health record, independently interpreting results (not separately reported) and communicating results to the patient/family/caregiver, or care coordination (not separately reported).        John Dumont MD   Maternal-Fetal Medicine      Electronically Signed by John Dumont February 6, 2025

## 2025-02-05 NOTE — ASSESSMENT & PLAN NOTE
Early onset selective FGR type 3 of twin B with biometry lag of 10 days, AC 7%, discordance of 26% and UA Dopplers again today demonstrating intermittent absent end diastolic velocity.  We did not remeasure the infants as it has only been 12 days since her last growth.  I have reviewed the ultrasound findings with the patient.  I have reviewed monochorionic placentation, including vascular connections and territorial sharing, and complications associated with it including TTTS, TAPS, and sFGR.  I informed the patient that survival after loss of a co-twin is linked to a high incidence (24-45%) of neurologic injury, likely from acute pressure and volume shifts from the living twin to the  twin through patent vascular channels.  She has previously been counseled on management options including expectant, termination of pregnancy, selective reduction, and fetoscopic laser. She continues to opt for expectant management at this time.    Recommendations:  The patient opted for expectant management at this time.  Referral to fetal intervention center in [place] for consultation and potential intervention was discussed with Amena and Jordi, they defer at this time  Weekly ultrasound assessments, including UA and MCA Dopplers for each twin  Due to the association of cardiac disease with Monochorionic twin pregnancy, recommend a fetal echocardiogram at 22 weeks gestation.  Fetal growth assessment every 3 weeks - next scheduled later next week (with MD visit)  Timing of delivery will be determined by multiple factors including interval growth at a later time in gestation.  Depending on growth at follow up visits we will discuss the possibility of inpatient admission vs outpatient steroids.    
Please see original consult for full counseling and recommendations   We also discussed that MCDA twins have higher rates of congenital anomalies, including heart defects. Today, anatomy for both twins is normal. Fetal echos need to be completed.    Recommendations:  ASA 81 mg daily  Folic acid 1 mg daily  Ferrous sulfate 325 mg every other day or daily (each is equally effective, but every other daily dosing can challenge adherence)  Continue Limited US every week until further notice (will be more frequent based on sFGR)  Fetal echo at 22-24 weeks (ordered today)  Given the increased risks of a twin pregnancy, prenatal visits every 2-3 weeks from 24 - 32/34 weeks and weekly prenatal visits thereafter  Mode of delivery is dependent on fetal presentation and other factors    Delivery timing: History of TTTS, FGR with abnormal UA Doppler, oligohydramnios, or comorbid conditions: 32 0/7 - 34 6/7 weeks gestation. However, exact timing recommendations will be made at a later time.    
Reviewed alternate menu options and menu ordering procedure. Provide food preferences within therapeutic diet when requested. RD to provide stewed prunes/prune juice as requested by pt.

## 2025-02-06 ENCOUNTER — PATIENT MESSAGE (OUTPATIENT)
Dept: OTHER | Facility: OTHER | Age: 20
End: 2025-02-06
Payer: MEDICAID

## 2025-02-12 ENCOUNTER — ROUTINE PRENATAL (OUTPATIENT)
Dept: OBSTETRICS AND GYNECOLOGY | Facility: CLINIC | Age: 20
End: 2025-02-12
Payer: MEDICAID

## 2025-02-12 VITALS
BODY MASS INDEX: 20.85 KG/M2 | WEIGHT: 117.75 LBS | SYSTOLIC BLOOD PRESSURE: 102 MMHG | DIASTOLIC BLOOD PRESSURE: 62 MMHG

## 2025-02-12 DIAGNOSIS — O36.5922 INTRAUTERINE GROWTH RESTRICTION, ANTEPARTUM, SECOND TRIMESTER, FETUS 2: ICD-10-CM

## 2025-02-12 DIAGNOSIS — O30.032 MONOCHORIONIC DIAMNIOTIC TWIN GESTATION IN SECOND TRIMESTER: Primary | ICD-10-CM

## 2025-02-12 PROCEDURE — 99213 OFFICE O/P EST LOW 20 MIN: CPT | Mod: TH,S$PBB,, | Performed by: STUDENT IN AN ORGANIZED HEALTH CARE EDUCATION/TRAINING PROGRAM

## 2025-02-12 PROCEDURE — 99999 PR PBB SHADOW E&M-EST. PATIENT-LVL III: CPT | Mod: PBBFAC,,, | Performed by: STUDENT IN AN ORGANIZED HEALTH CARE EDUCATION/TRAINING PROGRAM

## 2025-02-12 PROCEDURE — 99213 OFFICE O/P EST LOW 20 MIN: CPT | Mod: PBBFAC,TH | Performed by: STUDENT IN AN ORGANIZED HEALTH CARE EDUCATION/TRAINING PROGRAM

## 2025-02-12 NOTE — ASSESSMENT & PLAN NOTE
Please see original consult for full counseling and recommendations   Anatomy assessment completed. No anomalies aside from suspected unilateral right renal agenesis in Twin A.   Fetal echocardiograms scheduled 2/19.    Recommendations:  ASA 81 mg daily  Folic acid 1 mg daily  Ferrous sulfate 325 mg every other day or daily (each is equally effective, but every other daily dosing can challenge adherence)  Continue Limited US every week until further notice (will be more frequent based on sFGR)  Fetal echo scheduled  Given the increased risks of a twin pregnancy, prenatal visits every 2-3 weeks from 24 - 32/34 weeks and weekly prenatal visits thereafter  Mode of delivery is dependent on fetal presentation and other factors    Delivery timing: History of TTTS, FGR with abnormal UA Doppler, oligohydramnios, or comorbid conditions: 32 0/7 - 34 6/7 weeks gestation. However, exact timing recommendations will be made at a later time.

## 2025-02-12 NOTE — ASSESSMENT & PLAN NOTE
Early onset selective FGR type 1 (previously type 3) of twin B - overall EFW 7%, with overall growth discordance 24.3%.   See prior notes for full breadth of counseling.     Survival after loss of a co-twin is linked to a high incidence (24-45%) of neurologic injury, likely from acute pressure and volume shifts from the living twin to the  twin through patent vascular channels. She has previously been counseled on management options including expectant, termination of pregnancy, selective reduction, and fetoscopic laser. She continues to opt for expectant management at this time.    Recommendations:  Referral to fetal intervention center in [place] for consultation and potential intervention was previously discussed with Amena, she continues to defer  Weekly ultrasound assessments, including UA and MCA Dopplers for each twin  Fetal growth assessment every 3 weeks   Timing of delivery will be determined by multiple factors including interval growth at a later time in gestation.  Depending on growth at follow up visits we will discuss the possibility of inpatient admission vs outpatient steroids.

## 2025-02-12 NOTE — PATIENT INSTRUCTIONS
[facility], Labor and Delivery is on the 6th floor of [facility]: [phone]    [email or web address]

## 2025-02-12 NOTE — PROGRESS NOTES
Pt doing well. Fatigued. No persistent N/V issues  Denies vaginal bleeding, LOF, contractions. Reports regular fetal movement.   VS reviewed.  MFM monitoring, baby B has FGR. Has US tomorrow. Has peds cards arranged.   Glucola instructions reviewed   Labor and pre E precautions reviewed. BP warnings on AVS  [facility] info on AVS  RTC: 4 weeks

## 2025-02-13 ENCOUNTER — OFFICE VISIT (OUTPATIENT)
Dept: MATERNAL FETAL MEDICINE | Facility: CLINIC | Age: 20
End: 2025-02-13
Payer: MEDICAID

## 2025-02-13 ENCOUNTER — PROCEDURE VISIT (OUTPATIENT)
Dept: MATERNAL FETAL MEDICINE | Facility: CLINIC | Age: 20
End: 2025-02-13
Payer: MEDICAID

## 2025-02-13 VITALS
BODY MASS INDEX: 20.48 KG/M2 | WEIGHT: 115.63 LBS | SYSTOLIC BLOOD PRESSURE: 104 MMHG | DIASTOLIC BLOOD PRESSURE: 69 MMHG

## 2025-02-13 DIAGNOSIS — O30.032 MONOCHORIONIC DIAMNIOTIC TWIN GESTATION IN SECOND TRIMESTER: ICD-10-CM

## 2025-02-13 DIAGNOSIS — O36.5922 INTRAUTERINE GROWTH RESTRICTION, ANTEPARTUM, SECOND TRIMESTER, FETUS 2: ICD-10-CM

## 2025-02-13 DIAGNOSIS — Z36.89 ENCOUNTER FOR ULTRASOUND TO ASSESS FETAL GROWTH: ICD-10-CM

## 2025-02-13 DIAGNOSIS — O30.032 MONOCHORIONIC DIAMNIOTIC TWIN GESTATION IN SECOND TRIMESTER: Primary | ICD-10-CM

## 2025-02-13 PROCEDURE — 99999 PR PBB SHADOW E&M-EST. PATIENT-LVL II: CPT | Mod: PBBFAC,,, | Performed by: OBSTETRICS & GYNECOLOGY

## 2025-02-13 PROCEDURE — 76820 UMBILICAL ARTERY ECHO: CPT | Mod: PBBFAC | Performed by: OBSTETRICS & GYNECOLOGY

## 2025-02-13 PROCEDURE — 76821 MIDDLE CEREBRAL ARTERY ECHO: CPT | Mod: 59,PBBFAC | Performed by: OBSTETRICS & GYNECOLOGY

## 2025-02-13 PROCEDURE — 76816 OB US FOLLOW-UP PER FETUS: CPT | Mod: 26,59,S$PBB, | Performed by: OBSTETRICS & GYNECOLOGY

## 2025-02-13 PROCEDURE — 76821 MIDDLE CEREBRAL ARTERY ECHO: CPT | Mod: 26,S$PBB,, | Performed by: OBSTETRICS & GYNECOLOGY

## 2025-02-13 PROCEDURE — 3008F BODY MASS INDEX DOCD: CPT | Mod: CPTII,,, | Performed by: OBSTETRICS & GYNECOLOGY

## 2025-02-13 PROCEDURE — 99212 OFFICE O/P EST SF 10 MIN: CPT | Mod: PBBFAC,TH | Performed by: OBSTETRICS & GYNECOLOGY

## 2025-02-13 PROCEDURE — 76820 UMBILICAL ARTERY ECHO: CPT | Mod: 26,S$PBB,, | Performed by: OBSTETRICS & GYNECOLOGY

## 2025-02-13 PROCEDURE — 76816 OB US FOLLOW-UP PER FETUS: CPT | Mod: PBBFAC | Performed by: OBSTETRICS & GYNECOLOGY

## 2025-02-13 PROCEDURE — 3074F SYST BP LT 130 MM HG: CPT | Mod: CPTII,,, | Performed by: OBSTETRICS & GYNECOLOGY

## 2025-02-13 PROCEDURE — 3078F DIAST BP <80 MM HG: CPT | Mod: CPTII,,, | Performed by: OBSTETRICS & GYNECOLOGY

## 2025-02-13 PROCEDURE — 99213 OFFICE O/P EST LOW 20 MIN: CPT | Mod: S$PBB,TH,, | Performed by: OBSTETRICS & GYNECOLOGY

## 2025-02-13 PROCEDURE — 1159F MED LIST DOCD IN RCRD: CPT | Mod: CPTII,,, | Performed by: OBSTETRICS & GYNECOLOGY

## 2025-02-13 NOTE — PROGRESS NOTES
Maternal Fetal Medicine follow up consult    SUBJECTIVE:     Amena Theodore is a 19 y.o.  female with IUP at 21w0d who is seen in follow up consultation by MFM.  Pregnancy complications include:   Problem   Monochorionic Diamniotic Twin Gestation in Second Trimester   Intrauterine Growth Restriction, Antepartum, Second Trimester, Fetus 2       Previous notes reviewed.   No changes to medical, surgical, family, social, or obstetric history.    Interval history since last MFM visit:   Doing well today without complaints.     Medications reviewed.    Care team members:  Dr. Nava - Primary OB     OBJECTIVE:   /69 (BP Location: Left arm, Patient Position: Sitting)   Wt 52.4 kg (115 lb 10.1 oz)   LMP 2024   BMI 20.48 kg/m²     Ultrasound performed. See viewpoint for full ultrasound report.  1. Monochorionic-diamniotic twin pregnancy   2. Twin A  - Fetal size is AGA with  g (33%).  AC is at the 49%.  - Normal repeat limited fetal anatomic survey, still unable to visualize the right kidney  - Bladder is visualized.  - MVP is normal amount.   - Umbilical artery end diastolic flow is normal, and SD ratio is 31%  - MCA dopplers are normal at 1.16 MoM    3. Twin B  - Selective fetal growth restriction is observed with  g(7%) and  AC at the 13%.  - Normal repeat limited fetal anatomic survey.   - Bladder is visualized.  - MVP is normal amount.   - Umbilical artery end diastolic flow is normal, and SD ratio is 78%  - MCA dopplers are normal at 1.02 MoM    4. Growth discordance is 24.3 %.  5. There are no signs of TTTS or TAPS.    Significant labs/imaging:  None    ASSESSMENT/PLAN:     19 y.o.  female with IUP at 21w0d    Monochorionic diamniotic twin gestation in second trimester  Please see original consult for full counseling and recommendations   Anatomy assessment completed. No anomalies aside from suspected unilateral right renal agenesis in Twin A.   Fetal echocardiograms scheduled  .    Recommendations:  ASA 81 mg daily  Folic acid 1 mg daily  Ferrous sulfate 325 mg every other day or daily (each is equally effective, but every other daily dosing can challenge adherence)  Continue Limited US every week until further notice (will be more frequent based on sFGR)  Fetal echo scheduled  Given the increased risks of a twin pregnancy, prenatal visits every 2-3 weeks from 24 - 32/34 weeks and weekly prenatal visits thereafter  Mode of delivery is dependent on fetal presentation and other factors    Delivery timing: History of TTTS, FGR with abnormal UA Doppler, oligohydramnios, or comorbid conditions: 32 0/7 - 34 6/7 weeks gestation. However, exact timing recommendations will be made at a later time.      Intrauterine growth restriction, antepartum, second trimester, fetus 2  Early onset selective FGR type 1 (previously type 3) of twin B - overall EFW 7%, with overall growth discordance 24.3%.   See prior notes for full breadth of counseling.     Survival after loss of a co-twin is linked to a high incidence (24-45%) of neurologic injury, likely from acute pressure and volume shifts from the living twin to the  twin through patent vascular channels. She has previously been counseled on management options including expectant, termination of pregnancy, selective reduction, and fetoscopic laser. She continues to opt for expectant management at this time.    Recommendations:  Referral to fetal intervention center in [place] for consultation and potential intervention was previously discussed with Amena, she continues to defer  Weekly ultrasound assessments, including UA and MCA Dopplers for each twin  Fetal growth assessment every 3 weeks   Timing of delivery will be determined by multiple factors including interval growth at a later time in gestation.  Depending on growth at follow up visits we will discuss the possibility of inpatient admission vs outpatient steroids.    MFM follow up in 1  week scheduled.     Juani Hall MD  PGY 7  Maternal Fetal Medicine  [facility]

## 2025-02-19 ENCOUNTER — OFFICE VISIT (OUTPATIENT)
Dept: PEDIATRIC CARDIOLOGY | Facility: CLINIC | Age: 20
End: 2025-02-19
Attending: PEDIATRICS
Payer: MEDICAID

## 2025-02-19 ENCOUNTER — OFFICE VISIT (OUTPATIENT)
Dept: MATERNAL FETAL MEDICINE | Facility: CLINIC | Age: 20
End: 2025-02-19
Payer: MEDICAID

## 2025-02-19 ENCOUNTER — PROCEDURE VISIT (OUTPATIENT)
Dept: MATERNAL FETAL MEDICINE | Facility: CLINIC | Age: 20
End: 2025-02-19
Payer: MEDICAID

## 2025-02-19 ENCOUNTER — CLINICAL SUPPORT (OUTPATIENT)
Dept: PEDIATRIC CARDIOLOGY | Facility: CLINIC | Age: 20
End: 2025-02-19
Payer: MEDICAID

## 2025-02-19 VITALS
DIASTOLIC BLOOD PRESSURE: 71 MMHG | HEIGHT: 63 IN | BODY MASS INDEX: 20.69 KG/M2 | WEIGHT: 116.75 LBS | SYSTOLIC BLOOD PRESSURE: 116 MMHG | HEART RATE: 85 BPM

## 2025-02-19 VITALS — BODY MASS INDEX: 20.7 KG/M2 | SYSTOLIC BLOOD PRESSURE: 116 MMHG | WEIGHT: 116.88 LBS | DIASTOLIC BLOOD PRESSURE: 71 MMHG

## 2025-02-19 DIAGNOSIS — O30.032 MONOCHORIONIC DIAMNIOTIC TWIN GESTATION IN SECOND TRIMESTER: ICD-10-CM

## 2025-02-19 DIAGNOSIS — O36.5922 INTRAUTERINE GROWTH RESTRICTION, ANTEPARTUM, SECOND TRIMESTER, FETUS 2: ICD-10-CM

## 2025-02-19 DIAGNOSIS — O30.039 MONOCHORIONIC DIAMNIOTIC TWIN PREGNANCY, ANTEPARTUM: ICD-10-CM

## 2025-02-19 DIAGNOSIS — O36.5922 INTRAUTERINE GROWTH RESTRICTION, ANTEPARTUM, SECOND TRIMESTER, FETUS 2: Primary | ICD-10-CM

## 2025-02-19 DIAGNOSIS — O30.032 MONOCHORIONIC DIAMNIOTIC TWIN GESTATION IN SECOND TRIMESTER: Primary | ICD-10-CM

## 2025-02-19 PROCEDURE — 99213 OFFICE O/P EST LOW 20 MIN: CPT | Mod: PBBFAC,27 | Performed by: PEDIATRICS

## 2025-02-19 PROCEDURE — 99213 OFFICE O/P EST LOW 20 MIN: CPT | Mod: PBBFAC,TH | Performed by: OBSTETRICS & GYNECOLOGY

## 2025-02-19 PROCEDURE — 99213 OFFICE O/P EST LOW 20 MIN: CPT | Mod: S$PBB,TH,, | Performed by: OBSTETRICS & GYNECOLOGY

## 2025-02-19 PROCEDURE — 76825 ECHO EXAM OF FETAL HEART: CPT | Mod: PBBFAC | Performed by: PEDIATRICS

## 2025-02-19 PROCEDURE — 99211 OFF/OP EST MAY X REQ PHY/QHP: CPT | Mod: PBBFAC,27

## 2025-02-19 PROCEDURE — 93325 DOPPLER ECHO COLOR FLOW MAPG: CPT | Mod: PBBFAC | Performed by: PEDIATRICS

## 2025-02-19 PROCEDURE — 76825 ECHO EXAM OF FETAL HEART: CPT | Mod: 59,PBBFAC | Performed by: PEDIATRICS

## 2025-02-19 PROCEDURE — 93325 DOPPLER ECHO COLOR FLOW MAPG: CPT | Mod: 59,PBBFAC | Performed by: PEDIATRICS

## 2025-02-19 PROCEDURE — 76827 ECHO EXAM OF FETAL HEART: CPT | Mod: 59,PBBFAC | Performed by: PEDIATRICS

## 2025-02-19 PROCEDURE — 76827 ECHO EXAM OF FETAL HEART: CPT | Mod: PBBFAC | Performed by: PEDIATRICS

## 2025-02-19 PROCEDURE — 76821 MIDDLE CEREBRAL ARTERY ECHO: CPT | Mod: 59,PBBFAC | Performed by: OBSTETRICS & GYNECOLOGY

## 2025-02-19 NOTE — PROGRESS NOTES
Maternal Fetal Medicine follow up consult    SUBJECTIVE:     Amena Theodore is a 19 y.o.  female with IUP at 21w6d who is seen in follow up consultation by MFM.  Pregnancy complications include:   Problem   Monochorionic Diamniotic Twin Gestation in Second Trimester   Early onset selective FGR, fetus 2       Previous notes reviewed.   No changes to medical, surgical, family, social, or obstetric history.    Interval history since last MFM visit: No major changes. She feels well today.    Medications reviewed.    Care team members:  Dr. Nava - Primary OB       OBJECTIVE:   /71 (BP Location: Left arm, Patient Position: Sitting)   Wt 53 kg (116 lb 13.5 oz)   LMP 2024   BMI 20.70 kg/m²     Physical Exam  No Acute distress  Normal respiratory effort  Gravid Abdomen    Ultrasound performed. See viewpoint for full ultrasound report.  1. Monochorionic-diamniotic twin pregnancy   2. Twin A   - Bladder is visualized  - Right echogenic structure visualized in the fetal right pelvis that may represent a caudally displaced kidney  - Otherwise normal limited anatomic survey  - MVP is normal  - UA dopplers with persistent forward flow, S/D Ratio: 23%  - Normal  MCA dopplers, PSV  1.25 MoM  3. Twin B   - Bladder is visualized.  - Normal limited fetal anatomic survey.  - MVP is normal.    - UA dopplers with persistent forward flow, S/D Ratio: 37%  - Normal MCA dopplers, PSV 1.02 MoM  4. There are no signs of TTTS.       ASSESSMENT/PLAN:     19 y.o.  female with IUP at 21w6d    Early onset selective FGR, fetus 2  Early onset selective FGR of twin B. Previously Type 3, on last scan improved to Type 1. Today her dopplers show normal MCA PSV for both A and B, and UA dopplers with persistent forward flow. She remains type 1 sFGR.    See prior notes for full breadth of counseling.     Recommendations:  Referral to fetal intervention center in [place] for consultation and potential intervention was previously  discussed with Amena, she continues to defer  Weekly ultrasound assessments, including UA and MCA Dopplers for each twin  Fetal growth assessment every 3 weeks, last on 2/13.   Timing of delivery will be determined by multiple factors including interval growth at a later time in gestation.  Depending on growth at follow up visits we will discuss the possibility of inpatient admission vs outpatient steroids.    Monochorionic diamniotic twin gestation in second trimester  Please see original consult for full counseling and recommendations   Fetal echocardiograms scheduled today, read is pending.    Recommendations:  ASA 81 mg daily  Folic acid 1 mg daily  Ferrous sulfate 325 mg every other day or daily (each is equally effective, but every other daily dosing can challenge adherence)  Continue Limited US every week until further notice (will be more frequent based on sFGR)  Fetal echo scheduled today, read is pending  Given the increased risks of a twin pregnancy, prenatal visits every 2-3 weeks from 24 - 32/34 weeks and weekly prenatal visits thereafter  Mode of delivery is dependent on fetal presentation and other factors    Delivery timing: History of TTTS, FGR with abnormal UA Doppler, oligohydramnios, or comorbid conditions: 32 0/7 - 34 6/7 weeks gestation. However, exact timing recommendations will be made at a later time.    F/u in 1 weeks for MFM visit  F/u in 1 weeks for US    Brenda Hebert PGY5  Maternal Fetal Medicine Fellow

## 2025-02-19 NOTE — ASSESSMENT & PLAN NOTE
Please see original consult for full counseling and recommendations   Fetal echocardiograms scheduled today, read is pending.    Recommendations:  ASA 81 mg daily  Folic acid 1 mg daily  Ferrous sulfate 325 mg every other day or daily (each is equally effective, but every other daily dosing can challenge adherence)  Continue Limited US every week until further notice (will be more frequent based on sFGR)  Fetal echo scheduled today, read is pending  Given the increased risks of a twin pregnancy, prenatal visits every 2-3 weeks from 24 - 32/34 weeks and weekly prenatal visits thereafter  Mode of delivery is dependent on fetal presentation and other factors    Delivery timing: History of TTTS, FGR with abnormal UA Doppler, oligohydramnios, or comorbid conditions: 32 0/7 - 34 6/7 weeks gestation. However, exact timing recommendations will be made at a later time.

## 2025-02-19 NOTE — ASSESSMENT & PLAN NOTE
Early onset selective FGR of twin B. Previously Type 3, on last scan improved to Type 1. Today her dopplers show normal MCA PSV for both A and B, and UA dopplers with persistent forward flow. She remains type 1 sFGR.    See prior notes for full breadth of counseling.     Recommendations:  Referral to fetal intervention center in [place] for consultation and potential intervention was previously discussed with Amena, she continues to defer  Weekly ultrasound assessments, including UA and MCA Dopplers for each twin  Fetal growth assessment every 3 weeks, last on 2/13.   Timing of delivery will be determined by multiple factors including interval growth at a later time in gestation.  Depending on growth at follow up visits we will discuss the possibility of inpatient admission vs outpatient steroids.

## 2025-02-20 NOTE — PROGRESS NOTES
"Ms. Theodore  is a 19 y.o. year old  , referred by Dr. Beasley because of monochorionic-diamniotic twin pregnancy with fetal growth restriction twin B.    The patient presented at approximately 21 6/7 weeks gestation (Patient's last menstrual period was 2024.).  The patient denied any complaints.    Past medical history: Unremarkable.  Past surgical history: Negative.  Past gestational history: N/A    Family history: Negative for congenital heart disease, and sudden death during childhood.    Medications: Encounter Medications[1]    Allergies: Patient has no known allergies.    Blood pressure 116/71, pulse 85, height 5' 2.99" (1.6 m), weight 53 kg (116 lb 11.7 oz), last menstrual period 2024.    Fetal echocardiogram revealed the following:    Twin A (maternal right lower abdomen, vertex):  A four chamber fetal heart with situs solitus was seen.  The ventricles appeared to be equal in size.  The contractility of both ventricles was good.  The fetal heart rate was within the normal range, and regular.  The interventricular septum appeared to be intact.  There were normally related great arteries seen.  The ductal and aortic arch were well visualized, and appeared to be widely patent.  There was no pleural or pericardial effusion seen.  Normal fetal stomach and bladder visualized.  Fetal biometry compatible with stated gestational age.    Doppler analysis revealed a three vessel umbilical cord, with normal flow patterns, and velocities by Doppler.  There was a normal flow pattern seen in the ductus venosus.  There was evidence of normal systemic, and pulmonary venous return seen.  There was a normal right to left shunt seen across the foramen ovale.  There were normal inflow patterns seen across the AV-valves, without significant insufficiency.  There was no ventricular level shunt seen.  The right and left ventricular outflow tract, and ductal and aortic arch appeared to be unobstructed.    Twin B " (maternal left upper abdomen, transverse):  A four chamber fetal heart with situs solitus was seen.  The ventricles appeared to be equal in size.  The contractility of both ventricles was good.  The fetal heart rate was within the normal range, and regular.  The interventricular septum appeared to be intact.  There were normally related great arteries seen.  The ductal and aortic arch were well visualized, and appeared to be widely patent.  There was no pleural or pericardial effusion seen.  Normal fetal stomach and bladder visualized.  Fetal biometry compatible with gestational age 19 weeks.    Doppler analysis revealed a three vessel umbilical cord, with normal flow patterns, and velocities by Doppler.  There was a normal flow pattern seen in the ductus venosus.  There was evidence of normal systemic, and pulmonary venous return seen.  There was a normal right to left shunt seen across the foramen ovale.  There were normal inflow patterns seen across the AV-valves, without significant insufficiency.  There was no ventricular level shunt seen.  The right and left ventricular outflow tract, and ductal and aortic arch appeared to be unobstructed.    Impression:  It is our impression that Ms. Theodore had a normal fetal echocardiogram for both twins.  As you know, small defects, and coarctation of the aorta cannot always be ruled out on fetal echocardiogram.  We discussed our findings with the patient and her partner, reviewed our images, and answered their questions. We also discussed the limitations of fetal echocardiography, but emphasized that her child appears to have normal cardiac anatomy.  No further follow up is scheduled in our clinic, but, of course, we will always be available to reevaluate this patient, if needed.    The above information was discussed in detail including the use of diagrams, with 30 minutes of total face to face time, with greater than 50% with counseling and coordination of care.  The  discussion of the diagnosis and treatment options is as described above.      Time spent: 30 minutes, 50% dedicated to counseling.           [1]   Outpatient Encounter Medications as of 2/19/2025   Medication Sig Dispense Refill    aspirin (ECOTRIN) 81 MG EC tablet Take 1 tablet (81 mg total) by mouth once daily. 90 tablet 3    PNV,calcium 72/iron/folic acid (PRENATAL VITAMIN) Tab Take 1 tablet by mouth once daily. 90 tablet 3     No facility-administered encounter medications on file as of 2/19/2025.

## 2025-02-21 ENCOUNTER — RESULTS FOLLOW-UP (OUTPATIENT)
Dept: OBSTETRICS AND GYNECOLOGY | Facility: CLINIC | Age: 20
End: 2025-02-21

## 2025-02-27 ENCOUNTER — OFFICE VISIT (OUTPATIENT)
Dept: MATERNAL FETAL MEDICINE | Facility: CLINIC | Age: 20
End: 2025-02-27
Payer: MEDICAID

## 2025-02-27 ENCOUNTER — PROCEDURE VISIT (OUTPATIENT)
Dept: MATERNAL FETAL MEDICINE | Facility: CLINIC | Age: 20
End: 2025-02-27
Payer: MEDICAID

## 2025-02-27 VITALS
HEART RATE: 88 BPM | SYSTOLIC BLOOD PRESSURE: 107 MMHG | WEIGHT: 118.19 LBS | BODY MASS INDEX: 20.94 KG/M2 | DIASTOLIC BLOOD PRESSURE: 72 MMHG | HEIGHT: 63 IN

## 2025-02-27 DIAGNOSIS — O36.5922 INTRAUTERINE GROWTH RESTRICTION, ANTEPARTUM, SECOND TRIMESTER, FETUS 2: ICD-10-CM

## 2025-02-27 DIAGNOSIS — O30.032 MONOCHORIONIC DIAMNIOTIC TWIN GESTATION IN SECOND TRIMESTER: ICD-10-CM

## 2025-02-27 DIAGNOSIS — O30.032 MONOCHORIONIC DIAMNIOTIC TWIN GESTATION IN SECOND TRIMESTER: Primary | ICD-10-CM

## 2025-02-27 DIAGNOSIS — Z36.89 ENCOUNTER FOR ULTRASOUND TO ASSESS FETAL GROWTH: ICD-10-CM

## 2025-02-27 DIAGNOSIS — Z36.89 ENCOUNTER FOR ULTRASOUND TO ASSESS FETAL GROWTH: Primary | ICD-10-CM

## 2025-02-27 DIAGNOSIS — Z36.9 ENCOUNTER FOR FETAL ULTRASOUND: Primary | ICD-10-CM

## 2025-02-27 PROCEDURE — 3074F SYST BP LT 130 MM HG: CPT | Mod: CPTII,,, | Performed by: OBSTETRICS & GYNECOLOGY

## 2025-02-27 PROCEDURE — 99999 PR PBB SHADOW E&M-EST. PATIENT-LVL III: CPT | Mod: PBBFAC,,, | Performed by: OBSTETRICS & GYNECOLOGY

## 2025-02-27 PROCEDURE — 99213 OFFICE O/P EST LOW 20 MIN: CPT | Mod: PBBFAC,TH | Performed by: OBSTETRICS & GYNECOLOGY

## 2025-02-27 PROCEDURE — 3008F BODY MASS INDEX DOCD: CPT | Mod: CPTII,,, | Performed by: OBSTETRICS & GYNECOLOGY

## 2025-02-27 PROCEDURE — 3078F DIAST BP <80 MM HG: CPT | Mod: CPTII,,, | Performed by: OBSTETRICS & GYNECOLOGY

## 2025-02-27 PROCEDURE — 76816 OB US FOLLOW-UP PER FETUS: CPT | Mod: 59,PBBFAC | Performed by: OBSTETRICS & GYNECOLOGY

## 2025-02-27 PROCEDURE — 1159F MED LIST DOCD IN RCRD: CPT | Mod: CPTII,,, | Performed by: OBSTETRICS & GYNECOLOGY

## 2025-02-27 PROCEDURE — 99213 OFFICE O/P EST LOW 20 MIN: CPT | Mod: S$PBB,TH,, | Performed by: OBSTETRICS & GYNECOLOGY

## 2025-02-27 PROCEDURE — 76820 UMBILICAL ARTERY ECHO: CPT | Mod: 26,59,S$PBB, | Performed by: OBSTETRICS & GYNECOLOGY

## 2025-02-27 PROCEDURE — 76821 MIDDLE CEREBRAL ARTERY ECHO: CPT | Mod: 26,59,S$PBB, | Performed by: OBSTETRICS & GYNECOLOGY

## 2025-02-27 NOTE — PROGRESS NOTES
"Maternal Fetal Medicine follow up consult    SUBJECTIVE:     Amena Theodore is a 19 y.o.  female with IUP at 23w0d who is seen in follow up consultation by MFM.  Pregnancy complications include:   Problem   Monochorionic Diamniotic Twin Gestation in Second Trimester   Early onset selective FGR, fetus 2       Previous notes reviewed.   No changes to medical, surgical, family, social, or obstetric history.    Interval history since last MFM visit:   Doing well today without complaints.     Medications reviewed.    Care team members:  Dr. Nava - Primary OB     OBJECTIVE:   /72   Pulse 88   Ht 5' 3" (1.6 m)   Wt 53.6 kg (118 lb 2.7 oz)   LMP 2024   BMI 20.93 kg/m²     Ultrasound performed. See viewpoint for full ultrasound report.  1. Monochorionic-diamniotic twin pregnancy   2. Twin A   - Bladder is visualized  - Normal limited fetal anatomic survey.   - UAD are normal with persistent forward flow (S/D  26%)  - MCA dopplers are normal at 1.17 MoM  - MVP is normal  3. Twin B   - Bladder is visualized.  - Normal limited fetal anatomic survey.  - MVP is normal.    - UAD are normal with persistent forward flow (S/D  32%)  - MCA dopplers are normal at 0.95 MoM  4. There are no signs of TTTS or TAPS    Significant labs/imaging:  Low risk cfDNA      ASSESSMENT/PLAN:     19 y.o.  female with IUP at 23w0d    Monochorionic diamniotic twin gestation in second trimester  Please see original consult for full counseling and recommendations.  Recommendations:  ASA 81 mg daily  Folic acid 1 mg daily  Ferrous sulfate 325 mg every other day or daily (each is equally effective, but every other daily dosing can challenge adherence)  Continue Limited US every week until further notice   Fetal echo previously obtained, normal for both twins   Given the increased risks of a twin pregnancy, prenatal visits every 2-3 weeks from 24 - 32/34 weeks and weekly prenatal visits thereafter  Mode of delivery is dependent on " fetal presentation and other factors    Delivery timing: History of TTTS, FGR with abnormal UA Doppler, oligohydramnios, or comorbid conditions: 32 0/7 - 34 6/7 weeks gestation. However, exact timing recommendations will be made at a later time.    Early onset selective FGR, fetus 2  Early onset selective FGR of twin B. Previously Type 3 with subsequent improvement to Type 1. Today her dopplers show normal MCA PSV for both A and B, and UA dopplers with persistent forward flow. She remains type 1 sFGR.   See prior notes for full breadth of counseling.     Recommendations:  Referral to fetal intervention center in [place] for consultation and potential intervention was previously discussed with Amena, she continues to defer  Weekly ultrasound assessments, including UA and MCA Dopplers for each twin  Fetal growth assessment every 3 weeks, last on 2/13.   Timing of delivery will be determined by multiple factors including interval growth at a later time in gestation.  Depending on growth at follow up visits we will discuss the possibility of inpatient admission vs continued close outpatient observation.    Follow up in 1 week, scheduled 3/6.    Juani Hall MD  PGY 7  Maternal Fetal Medicine  [facility]

## 2025-02-27 NOTE — ASSESSMENT & PLAN NOTE
Early onset selective FGR of twin B. Previously Type 3 with subsequent improvement to Type 1. Today her dopplers show normal MCA PSV for both A and B, and UA dopplers with persistent forward flow. She remains type 1 sFGR.   See prior notes for full breadth of counseling.     Recommendations:  Referral to fetal intervention center in [place] for consultation and potential intervention was previously discussed with Amena, she continues to defer  Weekly ultrasound assessments, including UA and MCA Dopplers for each twin  Fetal growth assessment every 3 weeks, last on 2/13.   Timing of delivery will be determined by multiple factors including interval growth at a later time in gestation.  Depending on growth at follow up visits we will discuss the possibility of inpatient admission vs continued close outpatient observation.

## 2025-02-27 NOTE — ASSESSMENT & PLAN NOTE
Please see original consult for full counseling and recommendations.  Recommendations:  ASA 81 mg daily  Folic acid 1 mg daily  Ferrous sulfate 325 mg every other day or daily (each is equally effective, but every other daily dosing can challenge adherence)  Continue Limited US every week until further notice   Fetal echo previously obtained, normal for both twins   Given the increased risks of a twin pregnancy, prenatal visits every 2-3 weeks from 24 - 32/34 weeks and weekly prenatal visits thereafter  Mode of delivery is dependent on fetal presentation and other factors    Delivery timing: History of TTTS, FGR with abnormal UA Doppler, oligohydramnios, or comorbid conditions: 32 0/7 - 34 6/7 weeks gestation. However, exact timing recommendations will be made at a later time.

## 2025-03-06 ENCOUNTER — PROCEDURE VISIT (OUTPATIENT)
Dept: MATERNAL FETAL MEDICINE | Facility: CLINIC | Age: 20
End: 2025-03-06
Payer: MEDICAID

## 2025-03-06 ENCOUNTER — OFFICE VISIT (OUTPATIENT)
Dept: MATERNAL FETAL MEDICINE | Facility: CLINIC | Age: 20
End: 2025-03-06
Payer: MEDICAID

## 2025-03-06 ENCOUNTER — PATIENT MESSAGE (OUTPATIENT)
Dept: OTHER | Facility: OTHER | Age: 20
End: 2025-03-06
Payer: MEDICAID

## 2025-03-06 VITALS
DIASTOLIC BLOOD PRESSURE: 67 MMHG | BODY MASS INDEX: 21.7 KG/M2 | SYSTOLIC BLOOD PRESSURE: 105 MMHG | HEIGHT: 63 IN | WEIGHT: 122.44 LBS

## 2025-03-06 DIAGNOSIS — Z32.01 POSITIVE PREGNANCY TEST: ICD-10-CM

## 2025-03-06 DIAGNOSIS — O36.5922 INTRAUTERINE GROWTH RESTRICTION, ANTEPARTUM, SECOND TRIMESTER, FETUS 2: ICD-10-CM

## 2025-03-06 DIAGNOSIS — O30.039 MONOCHORIONIC DIAMNIOTIC TWIN PREGNANCY, ANTEPARTUM: ICD-10-CM

## 2025-03-06 DIAGNOSIS — Z36.9 ENCOUNTER FOR FETAL ULTRASOUND: ICD-10-CM

## 2025-03-06 DIAGNOSIS — O30.032 MONOCHORIONIC DIAMNIOTIC TWIN GESTATION IN SECOND TRIMESTER: Primary | ICD-10-CM

## 2025-03-06 DIAGNOSIS — N91.2 AMENORRHEA: ICD-10-CM

## 2025-03-06 PROCEDURE — 76820 UMBILICAL ARTERY ECHO: CPT | Mod: PBBFAC | Performed by: OBSTETRICS & GYNECOLOGY

## 2025-03-06 PROCEDURE — 99999 PR PBB SHADOW E&M-EST. PATIENT-LVL III: CPT | Mod: PBBFAC,,, | Performed by: OBSTETRICS & GYNECOLOGY

## 2025-03-06 PROCEDURE — 99213 OFFICE O/P EST LOW 20 MIN: CPT | Mod: PBBFAC,TH | Performed by: OBSTETRICS & GYNECOLOGY

## 2025-03-06 PROCEDURE — 76821 MIDDLE CEREBRAL ARTERY ECHO: CPT | Mod: 59,PBBFAC | Performed by: OBSTETRICS & GYNECOLOGY

## 2025-03-06 PROCEDURE — 76816 OB US FOLLOW-UP PER FETUS: CPT | Mod: 59,PBBFAC | Performed by: OBSTETRICS & GYNECOLOGY

## 2025-03-06 RX ORDER — ASPIRIN 81 MG/1
81 TABLET ORAL DAILY
Qty: 90 TABLET | Refills: 3 | Status: SHIPPED | OUTPATIENT
Start: 2025-03-06 | End: 2026-03-01

## 2025-03-06 RX ORDER — PRENATAL WITH FERROUS FUM AND FOLIC ACID 3080; 920; 120; 400; 22; 1.84; 3; 20; 10; 1; 12; 200; 27; 25; 2 [IU]/1; [IU]/1; MG/1; [IU]/1; MG/1; MG/1; MG/1; MG/1; MG/1; MG/1; UG/1; MG/1; MG/1; MG/1; MG/1
1 TABLET ORAL DAILY
Qty: 90 TABLET | Refills: 3 | Status: SHIPPED | OUTPATIENT
Start: 2025-03-06 | End: 2026-03-06

## 2025-03-06 NOTE — ASSESSMENT & PLAN NOTE
Please see original consult for full counseling and recommendations.  Recommendations:  ASA 81 mg daily  Folic acid 1 mg daily  Ferrous sulfate 325 mg every other day or daily   Continue Limited US every week until further notice   Fetal echo previously obtained, normal for both twins   Given the increased risks of a twin pregnancy, prenatal visits every 2-3 weeks from 24 - 32/34 weeks and weekly prenatal visits thereafter  Mode of delivery is dependent on fetal presentation and other factors    Delivery timing: History of TTTS, FGR with abnormal UA Doppler, oligohydramnios, or comorbid conditions: 32 0/7 - 34 6/7 weeks gestation. However, exact timing recommendations will be made at a later time.

## 2025-03-06 NOTE — ASSESSMENT & PLAN NOTE
Early onset selective FGR of twin B. Previously Type 3 with subsequent improvement to Type 1. Her dopplers show normal MCA PSV for both A and B, and UA dopplers with persistent forward flow for both. She remains type 1 sFGR.     Discordance today is at 31.9% (up from 26%).   See prior notes for full breadth of counseling.     Recommendations:  Referral to fetal intervention center in [place] for consultation and potential intervention was previously offered and declined.  Weekly ultrasound assessments, including UA and MCA Dopplers for each twin  Fetal growth assessment every 3 weeks  Timing of delivery will be determined by multiple factors including interval growth at a later time in gestation.

## 2025-03-06 NOTE — PROGRESS NOTES
"Maternal Fetal Medicine follow up consult    SUBJECTIVE:     Amena Theodore is a 19 y.o.  female with IUP at 24w0d who is seen in follow up consultation by MFM.  Pregnancy complications include:   Problem   Monochorionic Diamniotic Twin Gestation in Second Trimester   Early onset selective FGR, fetus 2       Previous notes reviewed.   No changes to medical, surgical, family, social, or obstetric history.    Interval history since last MFM visit: good fetal movement for both fetuses    Medications reviewed.    Care team members:  Dr. Nava - Primary OB       OBJECTIVE:   /67 (BP Location: Left arm, Patient Position: Sitting)   Ht 5' 3" (1.6 m)   Wt 55.6 kg (122 lb 7.5 oz)   LMP 2024   BMI 21.69 kg/m²     Ultrasound  performed. See viewpoint for full ultrasound report.  Live monochorionic-diamniotic twin pregnancy   A = Fetal size is AGA with the EFW at the 36% (626 g). AC is at the 35%.  Bladder is visualized.  Right pelvic kidney is visualized. The left fetal kidney appears normally positioned. Otherwise, normal repeat limited fetal anatomic survey.   MVP is normal.   UAD are normal with persistent forward flow (S/D 14%)  MCA dopplers are normal at 1.27 MoM     B = Fetal size is AGA with the EFW at the 4% (427 g). AC is at the 2%.  Bladder is visualized.  Normal repeat limited fetal anatomic survey.  MVP is normal.   UAD are normal with persistent forward flow (S/D 39%)  MCA dopplers are normal at 1.14 MoM  Growth discordance is 31.9 %.   There are no signs of TTTS or TAPS    ASSESSMENT/PLAN:     19 y.o.  female with IUP at 24w0d    Monochorionic diamniotic twin gestation in second trimester  Please see original consult for full counseling and recommendations.  Recommendations:  ASA 81 mg daily  Folic acid 1 mg daily  Ferrous sulfate 325 mg every other day or daily   Continue Limited US every week until further notice   Fetal echo previously obtained, normal for both twins   Given the " increased risks of a twin pregnancy, prenatal visits every 2-3 weeks from 24 - 32/34 weeks and weekly prenatal visits thereafter  Mode of delivery is dependent on fetal presentation and other factors    Delivery timing: History of TTTS, FGR with abnormal UA Doppler, oligohydramnios, or comorbid conditions: 32 0/7 - 34 6/7 weeks gestation. However, exact timing recommendations will be made at a later time.    Early onset selective FGR, fetus 2  Early onset selective FGR of twin B. Previously Type 3 with subsequent improvement to Type 1. Her dopplers show normal MCA PSV for both A and B, and UA dopplers with persistent forward flow for both. She remains type 1 sFGR.     Discordance today is at 31.9% (up from 26%).   See prior notes for full breadth of counseling.     Recommendations:  Referral to fetal intervention center in [place] for consultation and potential intervention was previously offered and declined.  Weekly ultrasound assessments, including UA and MCA Dopplers for each twin  Fetal growth assessment every 3 weeks  Timing of delivery will be determined by multiple factors including interval growth at a later time in gestation.    F/u in 1 weeks for MFM visit and US      20 minutes of total time spent on the encounter, which includes face to face time and non-face to face time preparing to see the patient (eg, review of tests), obtaining and/or reviewing separately obtained history, documenting clinical information in the electronic or other health record, independently interpreting results (not separately reported) and communicating results to the patient/family/caregiver, or care coordination (not separately reported).    William Fleming  Maternal-Fetal Medicine    Electronically Signed by William Fleming March 6, 2025

## 2025-03-12 ENCOUNTER — RESULTS FOLLOW-UP (OUTPATIENT)
Dept: OBSTETRICS AND GYNECOLOGY | Facility: CLINIC | Age: 20
End: 2025-03-12

## 2025-03-12 ENCOUNTER — ROUTINE PRENATAL (OUTPATIENT)
Dept: OBSTETRICS AND GYNECOLOGY | Facility: CLINIC | Age: 20
End: 2025-03-12
Payer: MEDICAID

## 2025-03-12 ENCOUNTER — LAB VISIT (OUTPATIENT)
Dept: LAB | Facility: OTHER | Age: 20
End: 2025-03-12
Attending: STUDENT IN AN ORGANIZED HEALTH CARE EDUCATION/TRAINING PROGRAM
Payer: MEDICAID

## 2025-03-12 ENCOUNTER — TELEPHONE (OUTPATIENT)
Dept: OBSTETRICS AND GYNECOLOGY | Facility: CLINIC | Age: 20
End: 2025-03-12
Payer: MEDICAID

## 2025-03-12 VITALS — DIASTOLIC BLOOD PRESSURE: 66 MMHG | WEIGHT: 121.5 LBS | BODY MASS INDEX: 21.52 KG/M2 | SYSTOLIC BLOOD PRESSURE: 100 MMHG

## 2025-03-12 DIAGNOSIS — O30.039 MONOCHORIONIC DIAMNIOTIC TWIN PREGNANCY, ANTEPARTUM: ICD-10-CM

## 2025-03-12 DIAGNOSIS — O30.032 MONOCHORIONIC DIAMNIOTIC TWIN GESTATION IN SECOND TRIMESTER: Primary | ICD-10-CM

## 2025-03-12 DIAGNOSIS — O99.019 ANEMIA AFFECTING FIRST PREGNANCY: Primary | ICD-10-CM

## 2025-03-12 DIAGNOSIS — O36.5922 INTRAUTERINE GROWTH RESTRICTION, ANTEPARTUM, SECOND TRIMESTER, FETUS 2: ICD-10-CM

## 2025-03-12 LAB
BASOPHILS # BLD AUTO: 0.01 K/UL (ref 0–0.2)
BASOPHILS NFR BLD: 0.1 % (ref 0–1.9)
DIFFERENTIAL METHOD BLD: ABNORMAL
EOSINOPHIL # BLD AUTO: 0.1 K/UL (ref 0–0.5)
EOSINOPHIL NFR BLD: 0.7 % (ref 0–8)
ERYTHROCYTE [DISTWIDTH] IN BLOOD BY AUTOMATED COUNT: 14.6 % (ref 11.5–14.5)
GLUCOSE SERPL-MCNC: 98 MG/DL (ref 70–140)
HCT VFR BLD AUTO: 28.1 % (ref 37–48.5)
HGB BLD-MCNC: 9.5 G/DL (ref 12–16)
IMM GRANULOCYTES # BLD AUTO: 0.02 K/UL (ref 0–0.04)
IMM GRANULOCYTES NFR BLD AUTO: 0.3 % (ref 0–0.5)
LYMPHOCYTES # BLD AUTO: 1.4 K/UL (ref 1–4.8)
LYMPHOCYTES NFR BLD: 18.9 % (ref 18–48)
MCH RBC QN AUTO: 30.4 PG (ref 27–31)
MCHC RBC AUTO-ENTMCNC: 33.8 G/DL (ref 32–36)
MCV RBC AUTO: 90 FL (ref 82–98)
MONOCYTES # BLD AUTO: 0.5 K/UL (ref 0.3–1)
MONOCYTES NFR BLD: 6.9 % (ref 4–15)
NEUTROPHILS # BLD AUTO: 5.5 K/UL (ref 1.8–7.7)
NEUTROPHILS NFR BLD: 73.1 % (ref 38–73)
NRBC BLD-RTO: 0 /100 WBC
PLATELET # BLD AUTO: 242 K/UL (ref 150–450)
PMV BLD AUTO: 10.1 FL (ref 9.2–12.9)
RBC # BLD AUTO: 3.12 M/UL (ref 4–5.4)
WBC # BLD AUTO: 7.5 K/UL (ref 3.9–12.7)

## 2025-03-12 PROCEDURE — 99213 OFFICE O/P EST LOW 20 MIN: CPT | Mod: TH,S$PBB,, | Performed by: STUDENT IN AN ORGANIZED HEALTH CARE EDUCATION/TRAINING PROGRAM

## 2025-03-12 PROCEDURE — 85025 COMPLETE CBC W/AUTO DIFF WBC: CPT | Performed by: STUDENT IN AN ORGANIZED HEALTH CARE EDUCATION/TRAINING PROGRAM

## 2025-03-12 PROCEDURE — 82950 GLUCOSE TEST: CPT | Performed by: STUDENT IN AN ORGANIZED HEALTH CARE EDUCATION/TRAINING PROGRAM

## 2025-03-12 PROCEDURE — 36415 COLL VENOUS BLD VENIPUNCTURE: CPT | Performed by: STUDENT IN AN ORGANIZED HEALTH CARE EDUCATION/TRAINING PROGRAM

## 2025-03-12 PROCEDURE — 99213 OFFICE O/P EST LOW 20 MIN: CPT | Mod: PBBFAC,TH | Performed by: STUDENT IN AN ORGANIZED HEALTH CARE EDUCATION/TRAINING PROGRAM

## 2025-03-12 PROCEDURE — 99999 PR PBB SHADOW E&M-EST. PATIENT-LVL III: CPT | Mod: PBBFAC,,, | Performed by: STUDENT IN AN ORGANIZED HEALTH CARE EDUCATION/TRAINING PROGRAM

## 2025-03-12 NOTE — TELEPHONE ENCOUNTER
----- Message from Mitzi Nava MD sent at 3/12/2025 12:56 PM CDT -----  [facility]  ----- Message -----  From: [employer] [facility]  Sent: 3/12/2025  12:16 PM CDT  To: Mitzi Nava MD

## 2025-03-13 ENCOUNTER — TELEPHONE (OUTPATIENT)
Dept: OBSTETRICS AND GYNECOLOGY | Facility: CLINIC | Age: 20
End: 2025-03-13
Payer: MEDICAID

## 2025-03-13 DIAGNOSIS — O99.019 ANEMIA AFFECTING FIRST PREGNANCY: Primary | ICD-10-CM

## 2025-03-13 NOTE — PROGRESS NOTES
Pt doing well. Denies vaginal bleeding, LOF, contractions. Reports regular fetal movement. Denies symptoms of pre E.  VS reviewed.  Glucola underway  CBC today. Reviewed recommended iron supplements. Will get iron studies.   Has close MFM follow up arranged  Tdap recs reviewed   Pump rx info on AVS  Labor and pre E precautions reviewed.   RTC: 4 weeks

## 2025-03-13 NOTE — PATIENT INSTRUCTIONS
"Find a pediatrician: you can google "find a doctor [facility]" and you should be able to separate them by location and start to wean them down, read reviews, make virtual "meet and greet" visits, etc    Tdap or Dtap for close family if not had in the past five years    [facility], Labor and Delivery is on the 6th floor of [facility]: [phone]    [email or web address]      Blood pressures to look out for:  Top number >140 OR bottom number>90  If elevated, wait 10-15minutes and then repeat  If still elevated, reach out to Doctor.  If top number >160 OR bottom >110, repeat  If still that high, proceed straight to the [facility]  "

## 2025-03-14 ENCOUNTER — PROCEDURE VISIT (OUTPATIENT)
Dept: MATERNAL FETAL MEDICINE | Facility: CLINIC | Age: 20
End: 2025-03-14
Payer: MEDICAID

## 2025-03-14 ENCOUNTER — OFFICE VISIT (OUTPATIENT)
Dept: MATERNAL FETAL MEDICINE | Facility: CLINIC | Age: 20
End: 2025-03-14
Payer: MEDICAID

## 2025-03-14 VITALS
SYSTOLIC BLOOD PRESSURE: 103 MMHG | DIASTOLIC BLOOD PRESSURE: 72 MMHG | BODY MASS INDEX: 21.87 KG/M2 | HEIGHT: 63 IN | WEIGHT: 123.44 LBS

## 2025-03-14 DIAGNOSIS — O36.5922 INTRAUTERINE GROWTH RESTRICTION, ANTEPARTUM, SECOND TRIMESTER, FETUS 2: Primary | ICD-10-CM

## 2025-03-14 DIAGNOSIS — O30.032 MONOCHORIONIC DIAMNIOTIC TWIN GESTATION IN SECOND TRIMESTER: ICD-10-CM

## 2025-03-14 DIAGNOSIS — O30.039 MONOCHORIONIC DIAMNIOTIC TWIN PREGNANCY, ANTEPARTUM: ICD-10-CM

## 2025-03-14 DIAGNOSIS — O99.012 ANEMIA DURING PREGNANCY IN SECOND TRIMESTER: ICD-10-CM

## 2025-03-14 PROCEDURE — 76816 OB US FOLLOW-UP PER FETUS: CPT | Mod: 59,PBBFAC | Performed by: OBSTETRICS & GYNECOLOGY

## 2025-03-14 PROCEDURE — 76820 UMBILICAL ARTERY ECHO: CPT | Mod: 26,59,S$PBB, | Performed by: OBSTETRICS & GYNECOLOGY

## 2025-03-14 PROCEDURE — 76821 MIDDLE CEREBRAL ARTERY ECHO: CPT | Mod: 26,59,S$PBB, | Performed by: OBSTETRICS & GYNECOLOGY

## 2025-03-14 PROCEDURE — 99213 OFFICE O/P EST LOW 20 MIN: CPT | Mod: PBBFAC,TH | Performed by: OBSTETRICS & GYNECOLOGY

## 2025-03-14 PROCEDURE — 99999 PR PBB SHADOW E&M-EST. PATIENT-LVL III: CPT | Mod: PBBFAC,,, | Performed by: OBSTETRICS & GYNECOLOGY

## 2025-03-14 NOTE — PROGRESS NOTES
"Maternal Fetal Medicine follow up consult    SUBJECTIVE:     Amena Theodore is a 19 y.o.  female with IUP at 25w1d who is seen in follow up consultation by MFM.  Pregnancy complications include:   Problem   Monochorionic Diamniotic Twin Gestation in Second Trimester   Early onset selective FGR, fetus 2       Previous notes reviewed.   No changes to medical, surgical, family, social, or obstetric history.    Interval history since last M visit: Doing well today. Normal glucose screen. No complaints    Medications reviewed.    Care team members:  Dr. Nava - Primary OB     OBJECTIVE:   /72 (BP Location: Left arm, Patient Position: Sitting)   Ht 5' 3" (1.6 m)   Wt 56 kg (123 lb 7.3 oz)   LMP 2024   BMI 21.87 kg/m²     Ultrasound performed. See viewpoint for full ultrasound report.  1. Monochorionic-diamniotic twin pregnancy   2. Twin A   - Bladder is visualized  - Normal limited fetal anatomic survey.   - MVP is normal  - Umbilical artery doppler end diastolic flow is normal with S/D ratio 46%.   - Middle cerebral artery Doppler peak systolic velocity measures normal at 1.12 MoM.  3. Twin B   - Bladder is visualized.  - Normal limited fetal anatomic survey.  - MVP is normal.    - Umbilical artery doppler end diastolic flow is normal with S/D ratio 35%.   - Middle cerebral artery Doppler peak systolic velocity measures normal at 1.23 MoM.  4. There are no signs of TTTS.     Significant labs/imaging:  Cell free DNA: low risk    Lab Results   Component Value Date    WBC 7.50 2025    HGB 9.5 (L) 2025    HCT 28.1 (L) 2025    MCV 90 2025     2025       Lab Results   Component Value Date     2025    AST 14 2024    ALT 13 (L) 2024    CREATININE 0.47 (L) 2024     No results found for: "UTPCR"    ASSESSMENT/PLAN:     19 y.o.  female with IUP at 25w1d    Monochorionic diamniotic twin gestation in second trimester  See prior " counseling  Normal glucose screening    Recommendations:  1. Continue ASA 81 mg daily  2. Continue care with Dr. Nava    Anemia during pregnancy in second trimester  Last H/H (3/12/25): 9.5/28%  Current regimen:   - Dr. Nava called to review recommendations for iron supplementation, patient yet to start    Normocytic, iron studies pending    Recommendations:  1. Start daily (or every other day) iron supplementation (primary team to coordinate)  2. Repeat CBC with iron studies ordered  - If persistent iron deficiency anemia, consider parenteral iron (primary team to coordinate)    Early onset selective FGR, fetus 2  Early onset selective FGR type III of twin B with biometry lag of 10 days, AC 7%, discordance of 26% and UA Dopplers demonstrating intermittent absent end diastolic velocity.   - Declined referral for possible intervention at that time.  - Subsequent normalization of UAD, currently type 1 sFGR.    Normal UA/MCA Dopplers again today, no evidence of TTTS.    Recommendations:  1. Continue weekly Doppler assessments with MFM visits  2. Continue serial evaluations for TTTS  3. Interval growth assessment due at 27 weeks    F/u in 1 weeks for MFM visit  F/u in 1 weeks for US    Thank you for the opportunity to participate in the care of Amena. Today I spent 30 minutes in the care of Ms. Theodore. This includes face to face time and non-face to face time preparing to see the patient (eg, review of tests), obtaining and/or reviewing separately obtained history, documenting clinical information in the electronic or other health record, independently interpreting results and communicating results to the patient/family/caregiver, or care coordination.       JUANA Beasley MD/MPH  Maternal Fetal Medicine

## 2025-03-14 NOTE — ASSESSMENT & PLAN NOTE
Last H/H (3/12/25): 9.5/28%  Current regimen:   - Dr. Nava called to review recommendations for iron supplementation, patient yet to start    Normocytic, iron studies pending    Recommendations:  1. Start daily (or every other day) iron supplementation (primary team to coordinate)  2. Repeat CBC with iron studies ordered  - If persistent iron deficiency anemia, consider parenteral iron (primary team to coordinate)

## 2025-03-14 NOTE — ASSESSMENT & PLAN NOTE
See prior counseling  Normal glucose screening    Recommendations:  1. Continue ASA 81 mg daily  2. Continue care with Dr. Nava

## 2025-03-14 NOTE — ASSESSMENT & PLAN NOTE
Early onset selective FGR type III of twin B with biometry lag of 10 days, AC 7%, discordance of 26% and UA Dopplers demonstrating intermittent absent end diastolic velocity.   - Declined referral for possible intervention at that time.  - Subsequent normalization of UAD, currently type 1 sFGR.    Normal UA/MCA Dopplers again today, no evidence of TTTS.    Recommendations:  1. Continue weekly Doppler assessments with MFM visits  2. Continue serial evaluations for TTTS  3. Interval growth assessment due at 27 weeks

## 2025-03-18 ENCOUNTER — TELEPHONE (OUTPATIENT)
Dept: MATERNAL FETAL MEDICINE | Facility: CLINIC | Age: 20
End: 2025-03-18
Payer: MEDICAID

## 2025-03-18 NOTE — TELEPHONE ENCOUNTER
Called patient and have her scheduled for every Thursday through April.    ----- Message from Med Assistant Stanford sent at 3/18/2025  2:26 PM CDT -----  Regarding: Weekly US appt  Pt called and said her appt for this week on 3/20 was cancelled and she isn't sure why. Can someone please see if she needs to be r/s and give her a call? She would prefer to keep it on 3/20 if that is possible.

## 2025-03-20 ENCOUNTER — PATIENT MESSAGE (OUTPATIENT)
Dept: OTHER | Facility: OTHER | Age: 20
End: 2025-03-20
Payer: MEDICAID

## 2025-03-20 ENCOUNTER — OFFICE VISIT (OUTPATIENT)
Dept: MATERNAL FETAL MEDICINE | Facility: CLINIC | Age: 20
End: 2025-03-20
Payer: MEDICAID

## 2025-03-20 ENCOUNTER — PROCEDURE VISIT (OUTPATIENT)
Dept: MATERNAL FETAL MEDICINE | Facility: CLINIC | Age: 20
End: 2025-03-20
Payer: MEDICAID

## 2025-03-20 VITALS — DIASTOLIC BLOOD PRESSURE: 64 MMHG | WEIGHT: 128.5 LBS | BODY MASS INDEX: 22.77 KG/M2 | SYSTOLIC BLOOD PRESSURE: 120 MMHG

## 2025-03-20 DIAGNOSIS — O30.032 MONOCHORIONIC DIAMNIOTIC TWIN GESTATION IN SECOND TRIMESTER: ICD-10-CM

## 2025-03-20 DIAGNOSIS — O99.012 ANEMIA DURING PREGNANCY IN SECOND TRIMESTER: ICD-10-CM

## 2025-03-20 DIAGNOSIS — O36.5922 INTRAUTERINE GROWTH RESTRICTION, ANTEPARTUM, SECOND TRIMESTER, FETUS 2: Primary | ICD-10-CM

## 2025-03-20 DIAGNOSIS — O30.039 MONOCHORIONIC DIAMNIOTIC TWIN PREGNANCY, ANTEPARTUM: ICD-10-CM

## 2025-03-20 PROCEDURE — 76816 OB US FOLLOW-UP PER FETUS: CPT | Mod: 26,59,S$PBB, | Performed by: OBSTETRICS & GYNECOLOGY

## 2025-03-20 PROCEDURE — 99999 PR PBB SHADOW E&M-EST. PATIENT-LVL II: CPT | Mod: PBBFAC,,, | Performed by: OBSTETRICS & GYNECOLOGY

## 2025-03-20 PROCEDURE — 99214 OFFICE O/P EST MOD 30 MIN: CPT | Mod: S$PBB,TH,, | Performed by: OBSTETRICS & GYNECOLOGY

## 2025-03-20 PROCEDURE — 1160F RVW MEDS BY RX/DR IN RCRD: CPT | Mod: CPTII,,, | Performed by: OBSTETRICS & GYNECOLOGY

## 2025-03-20 PROCEDURE — 1159F MED LIST DOCD IN RCRD: CPT | Mod: CPTII,,, | Performed by: OBSTETRICS & GYNECOLOGY

## 2025-03-20 PROCEDURE — 99212 OFFICE O/P EST SF 10 MIN: CPT | Mod: PBBFAC,TH | Performed by: OBSTETRICS & GYNECOLOGY

## 2025-03-20 PROCEDURE — 76821 MIDDLE CEREBRAL ARTERY ECHO: CPT | Mod: 26,59,S$PBB, | Performed by: OBSTETRICS & GYNECOLOGY

## 2025-03-20 PROCEDURE — 76820 UMBILICAL ARTERY ECHO: CPT | Mod: 59,PBBFAC | Performed by: OBSTETRICS & GYNECOLOGY

## 2025-03-20 RX ORDER — FERROUS SULFATE 324(65)MG
324 TABLET, DELAYED RELEASE (ENTERIC COATED) ORAL EVERY OTHER DAY
Qty: 45 TABLET | Refills: 1 | Status: SHIPPED | OUTPATIENT
Start: 2025-03-20 | End: 2025-03-20 | Stop reason: SDUPTHER

## 2025-03-20 RX ORDER — FERROUS SULFATE 324(65)MG
324 TABLET, DELAYED RELEASE (ENTERIC COATED) ORAL EVERY OTHER DAY
Qty: 45 TABLET | Refills: 1 | Status: SHIPPED | OUTPATIENT
Start: 2025-03-20 | End: 2025-09-16

## 2025-03-20 NOTE — PROGRESS NOTES
Maternal Fetal Medicine follow up consult    SUBJECTIVE:     Amena Theodore is a 19 y.o.  female with IUP at 26w0d who is seen in follow up consultation by MFM.  Pregnancy complications include:   Problem   Anemia During Pregnancy in Second Trimester   Monochorionic Diamniotic Twin Gestation in Second Trimester   Early onset selective FGR, fetus 2       Previous notes reviewed.   No changes to medical, surgical, family, social, or obstetric history.    Interval history since last MFM visit: Doing well today, no complaints.    Medications reviewed.    Care team members:  Dr. Nava - Primary OB       OBJECTIVE:   LMP 2024  /64    Ultrasound performed. See viewpoint for full ultrasound report.  1. Monochorionic-diamniotic twin pregnancy   2. Twin A   - Bladder is visualized  - Normal limited fetal anatomic survey with previously noted R sided pelvic kidney.   - MVP is normal  - Umbilical artery doppler end diastolic flow is normal with S/D ratio 17%.   - Middle cerebral artery Doppler peak systolic velocity measures normal at 1.11 MoM.  - Ductus venosus waveforms appear normal.  3. Twin B   - Bladder is visualized.  - Normal limited fetal anatomic survey.  - MVP is normal.    - Umbilical artery doppler end diastolic flow is normal with S/D ratio 66%.   - Middle cerebral artery Doppler peak systolic velocity measures normal at 1.08 MoM.  - Ductus venosus waveforms appear normal.  4. There are no signs of TTTS.     Significant labs/imaging:  Lab Results   Component Value Date    WBC 7.50 2025    HGB 9.5 (L) 2025    HCT 28.1 (L) 2025    MCV 90 2025     2025         ASSESSMENT/PLAN:     19 y.o.  female with IUP at 26w0d    Early onset selective FGR, fetus 2  Early onset selective FGR type III of twin B with biometry lag of 10 days, AC 7%, discordance of 26% and UA Dopplers demonstrating intermittent absent end diastolic velocity.   - Declined referral for possible  intervention at that time.  - Subsequent normalization of UAD, currently type 1 sFGR.    Normal UA/MCA Dopplers again today, no evidence of TTTS.    Recommendations:  1. Continue weekly Doppler assessments with MFM visits  2. Continue serial evaluations for TTTS  3. Interval growth assessment due next week    Monochorionic diamniotic twin gestation in second trimester  See prior counseling  Normal glucose screening    Recommendations:  1. Continue ASA 81 mg daily  2. Continue care with Dr. Nava    Anemia during pregnancy in second trimester  Last H/H (3/12/25): 9.5/28%  Current regimen:   - Dr. Nava called to review recommendations for iron supplementation, patient yet to start    Normocytic, iron studies pending    Recommendations:  1. Start daily (or every other day) iron supplementation (Rx to pharmacy)  2. Repeat CBC with iron studies ordered  - If persistent iron deficiency anemia, consider parenteral iron (primary team to coordinate)    F/u in 1 weeks for [facility] visit  F/u in 1 weeks for US      JUANA Beasley MD/MPH  Maternal Fetal Medicine

## 2025-03-20 NOTE — ASSESSMENT & PLAN NOTE
Early onset selective FGR type III of twin B with biometry lag of 10 days, AC 7%, discordance of 26% and UA Dopplers demonstrating intermittent absent end diastolic velocity.   - Declined referral for possible intervention at that time.  - Subsequent normalization of UAD, currently type 1 sFGR.    Normal UA/MCA Dopplers again today, no evidence of TTTS.    Recommendations:  1. Continue weekly Doppler assessments with MFM visits  2. Continue serial evaluations for TTTS  3. Interval growth assessment due next week

## 2025-03-20 NOTE — ASSESSMENT & PLAN NOTE
Last H/H (3/12/25): 9.5/28%  Current regimen:   - Dr. Nava called to review recommendations for iron supplementation, patient yet to start    Normocytic, iron studies pending    Recommendations:  1. Start daily (or every other day) iron supplementation (Rx to pharmacy)  2. Repeat CBC with iron studies ordered  - If persistent iron deficiency anemia, consider parenteral iron (primary team to coordinate)

## 2025-03-27 ENCOUNTER — OFFICE VISIT (OUTPATIENT)
Dept: MATERNAL FETAL MEDICINE | Facility: CLINIC | Age: 20
End: 2025-03-27
Payer: MEDICAID

## 2025-03-27 ENCOUNTER — PROCEDURE VISIT (OUTPATIENT)
Dept: MATERNAL FETAL MEDICINE | Facility: CLINIC | Age: 20
End: 2025-03-27
Payer: MEDICAID

## 2025-03-27 VITALS
WEIGHT: 129.44 LBS | SYSTOLIC BLOOD PRESSURE: 111 MMHG | BODY MASS INDEX: 22.92 KG/M2 | DIASTOLIC BLOOD PRESSURE: 65 MMHG

## 2025-03-27 DIAGNOSIS — O30.039 MONOCHORIONIC DIAMNIOTIC TWIN PREGNANCY, ANTEPARTUM: ICD-10-CM

## 2025-03-27 DIAGNOSIS — O30.032 MONOCHORIONIC DIAMNIOTIC TWIN GESTATION IN SECOND TRIMESTER: ICD-10-CM

## 2025-03-27 DIAGNOSIS — O30.032 MONOCHORIONIC DIAMNIOTIC TWIN GESTATION IN SECOND TRIMESTER: Primary | ICD-10-CM

## 2025-03-27 DIAGNOSIS — O36.5922 INTRAUTERINE GROWTH RESTRICTION, ANTEPARTUM, SECOND TRIMESTER, FETUS 2: ICD-10-CM

## 2025-03-27 PROCEDURE — 76821 MIDDLE CEREBRAL ARTERY ECHO: CPT | Mod: 26,S$PBB,, | Performed by: OBSTETRICS & GYNECOLOGY

## 2025-03-27 PROCEDURE — 76820 UMBILICAL ARTERY ECHO: CPT | Mod: PBBFAC | Performed by: OBSTETRICS & GYNECOLOGY

## 2025-03-27 PROCEDURE — 3074F SYST BP LT 130 MM HG: CPT | Mod: CPTII,,, | Performed by: OBSTETRICS & GYNECOLOGY

## 2025-03-27 PROCEDURE — 1159F MED LIST DOCD IN RCRD: CPT | Mod: CPTII,,, | Performed by: OBSTETRICS & GYNECOLOGY

## 2025-03-27 PROCEDURE — 3078F DIAST BP <80 MM HG: CPT | Mod: CPTII,,, | Performed by: OBSTETRICS & GYNECOLOGY

## 2025-03-27 PROCEDURE — 99999 PR PBB SHADOW E&M-EST. PATIENT-LVL II: CPT | Mod: PBBFAC,,, | Performed by: OBSTETRICS & GYNECOLOGY

## 2025-03-27 PROCEDURE — 76816 OB US FOLLOW-UP PER FETUS: CPT | Mod: 26,59,S$PBB, | Performed by: OBSTETRICS & GYNECOLOGY

## 2025-03-27 PROCEDURE — 99214 OFFICE O/P EST MOD 30 MIN: CPT | Mod: TH,25,S$PBB, | Performed by: OBSTETRICS & GYNECOLOGY

## 2025-03-27 PROCEDURE — 99212 OFFICE O/P EST SF 10 MIN: CPT | Mod: PBBFAC,TH | Performed by: OBSTETRICS & GYNECOLOGY

## 2025-03-27 PROCEDURE — 3008F BODY MASS INDEX DOCD: CPT | Mod: CPTII,,, | Performed by: OBSTETRICS & GYNECOLOGY

## 2025-03-27 NOTE — ASSESSMENT & PLAN NOTE
Early onset selective FGR, initially type III, now with persistently normal UA Dopplers, indicating type 1.  - Declined referral for possible intervention at that time.    Normal UA/MCA Dopplers again today, no evidence of TTTS or TAPS    Recommendations:  1. Continue weekly Doppler assessments with MFM visits  2. Continue serial evaluations for TTTS and TAPS  3. Interval growth assessment q3-4 weeks  4. Delivery timing previously recommended 32-34 weeks when the clinical picture was consistent with sFGR type III. Now with persistently normal UA Dopplers, may consider pushing delivery to the late  period (34-35 weeks). Delivery timing to be determined based on follow up ultrasound assessments.

## 2025-03-27 NOTE — ASSESSMENT & PLAN NOTE
See prior counseling  Normal glucose screening  Fetal echocardiograms WNL x2    Recommendations:  1. Continue ASA 81 mg daily  2. Continue care with Dr. aNva

## 2025-03-27 NOTE — PROGRESS NOTES
Maternal Fetal Medicine follow up consult    SUBJECTIVE:     Amena Theodore is a 19 y.o.  female with IUP at 27w0d  who is seen in follow up consultation by M.  Pregnancy complications include:   Problem   Monochorionic Diamniotic Twin Gestation in Second Trimester   Early onset selective FGR, fetus 2       Previous notes reviewed.   No changes to medical, surgical, family, social, or obstetric history.    Interval history since last [facility] visit: The patient reports adequate fetal movement. She denies leakage of fluid, vaginal bleeding, contractions.  The patient denies symptoms of pre-eclampsia including headache, blurred vision, right upper quadrant pain, chest pain, and shortness of breath.     Medications:  PNV  ASA     OBJECTIVE:     Blood Pressure: 111/65    Ultrasound performed. See viewpoint for full ultrasound report.  1. Monochorionic-diamniotic twin pregnancy   2. Twin A  - Fetal size is AGA with the EFW at the 39%.  AC is at the 31%.  - Right pelvic kidney noted. Otherwise normal repeat limited fetal anatomic survey.   - Bladder is visualized.  - MVP is normal amount.   - UA and MCA Dopplers are normal   3. Twin B  - selective FGR is again noted with the EFW at the 11%. AC is at the 1%.  - Normal repeat limited fetal anatomic survey.  - Bladder is visualized.  - MVP is normal amount.   - UA and MCA Dopplers are normal   4. Growth discordance is 24.7 %.   5. There are no signs of TTTS or TAPS   6. These findings are consistent with type 1 sFGR  - DV Dopplers are exhibit a normal triphasic pattern in both fetuses    ASSESSMENT/PLAN:     19 y.o.  female with IUP at 27w0d     Problems addressed at today's visit:  Monochorionic diamniotic twin gestation in second trimester  See prior counseling  Normal glucose screening  Fetal echocardiograms WNL x2    Recommendations:  1. Continue ASA 81 mg daily  2. Continue care with Dr. Nava    Early onset selective FGR, fetus 2  Early onset selective FGR,  initially type III, now with persistently normal UA Dopplers, indicating type 1.  - Declined referral for possible intervention at that time.    Normal UA/MCA Dopplers again today, no evidence of TTTS or TAPS    Recommendations:  1. Continue weekly Doppler assessments with MFM visits  2. Continue serial evaluations for TTTS and TAPS  3. Interval growth assessment q3-4 weeks  4. Delivery timing previously recommended 32-34 weeks when the clinical picture was consistent with sFGR type III. Now with persistently normal UA Dopplers, may consider pushing delivery to the late  period (34-35 weeks). Delivery timing to be determined based on follow up ultrasound assessments.       Please see original MFM consultation for full details regarding management recommendations of these and other obstetric co-morbidities    FOLLOW UP:   F/u in 1 weeks for US/MFM visit    Today I have spent 20 minutes in the care of the patient. This includes face to face time and non-face to face time preparing to see the patient (eg, review of tests), obtaining and/or reviewing separately obtained history, documenting clinical information in the electronic or other health record, independently interpreting results and communicating results to the patient/family/caregiver, or care coordination.     Sole Thurston MD  Maternal Fetal Medicine

## 2025-04-03 ENCOUNTER — OFFICE VISIT (OUTPATIENT)
Dept: MATERNAL FETAL MEDICINE | Facility: CLINIC | Age: 20
End: 2025-04-03
Payer: MEDICAID

## 2025-04-03 ENCOUNTER — LAB VISIT (OUTPATIENT)
Dept: LAB | Facility: OTHER | Age: 20
End: 2025-04-03
Attending: STUDENT IN AN ORGANIZED HEALTH CARE EDUCATION/TRAINING PROGRAM
Payer: MEDICAID

## 2025-04-03 ENCOUNTER — PATIENT MESSAGE (OUTPATIENT)
Dept: OTHER | Facility: OTHER | Age: 20
End: 2025-04-03
Payer: MEDICAID

## 2025-04-03 ENCOUNTER — PROCEDURE VISIT (OUTPATIENT)
Dept: MATERNAL FETAL MEDICINE | Facility: CLINIC | Age: 20
End: 2025-04-03
Payer: MEDICAID

## 2025-04-03 VITALS
HEIGHT: 63 IN | WEIGHT: 132.63 LBS | SYSTOLIC BLOOD PRESSURE: 111 MMHG | DIASTOLIC BLOOD PRESSURE: 71 MMHG | BODY MASS INDEX: 23.5 KG/M2

## 2025-04-03 DIAGNOSIS — O36.5922 INTRAUTERINE GROWTH RESTRICTION, ANTEPARTUM, SECOND TRIMESTER, FETUS 2: ICD-10-CM

## 2025-04-03 DIAGNOSIS — O30.032 MONOCHORIONIC DIAMNIOTIC TWIN GESTATION IN SECOND TRIMESTER: ICD-10-CM

## 2025-04-03 DIAGNOSIS — O99.019 ANEMIA AFFECTING FIRST PREGNANCY: ICD-10-CM

## 2025-04-03 DIAGNOSIS — O30.032 MONOCHORIONIC DIAMNIOTIC TWIN GESTATION IN SECOND TRIMESTER: Primary | ICD-10-CM

## 2025-04-03 LAB
ABSOLUTE EOSINOPHIL (OHS): 0.04 K/UL
ABSOLUTE MONOCYTE (OHS): 0.69 K/UL (ref 0.3–1)
ABSOLUTE NEUTROPHIL COUNT (OHS): 5.49 K/UL (ref 1.8–7.7)
BASOPHILS # BLD AUTO: 0.01 K/UL
BASOPHILS NFR BLD AUTO: 0.1 %
ERYTHROCYTE [DISTWIDTH] IN BLOOD BY AUTOMATED COUNT: 13.9 % (ref 11.5–14.5)
FERRITIN SERPL-MCNC: 16 NG/ML (ref 20–300)
HCT VFR BLD AUTO: 30.5 % (ref 37–48.5)
HGB BLD-MCNC: 9.8 GM/DL (ref 12–16)
IMM GRANULOCYTES # BLD AUTO: 0.02 K/UL (ref 0–0.04)
IMM GRANULOCYTES NFR BLD AUTO: 0.3 % (ref 0–0.5)
IRON SATN MFR SERPL: 24 % (ref 20–50)
IRON SERPL-MCNC: 119 UG/DL (ref 30–160)
LYMPHOCYTES # BLD AUTO: 1.61 K/UL (ref 1–4.8)
MCH RBC QN AUTO: 29.4 PG (ref 27–31)
MCHC RBC AUTO-ENTMCNC: 32.1 G/DL (ref 32–36)
MCV RBC AUTO: 92 FL (ref 82–98)
NUCLEATED RBC (/100WBC) (OHS): 0 /100 WBC
PLATELET # BLD AUTO: 229 K/UL (ref 150–450)
PMV BLD AUTO: 9.9 FL (ref 9.2–12.9)
RBC # BLD AUTO: 3.33 M/UL (ref 4–5.4)
RELATIVE EOSINOPHIL (OHS): 0.5 %
RELATIVE LYMPHOCYTE (OHS): 20.5 % (ref 18–48)
RELATIVE MONOCYTE (OHS): 8.8 % (ref 4–15)
RELATIVE NEUTROPHIL (OHS): 69.8 % (ref 38–73)
TIBC SERPL-MCNC: 496 UG/DL (ref 250–450)
TRANSFERRIN SERPL-MCNC: 335 MG/DL (ref 200–375)
WBC # BLD AUTO: 7.86 K/UL (ref 3.9–12.7)

## 2025-04-03 PROCEDURE — 76820 UMBILICAL ARTERY ECHO: CPT | Mod: 59,PBBFAC | Performed by: OBSTETRICS & GYNECOLOGY

## 2025-04-03 PROCEDURE — 99213 OFFICE O/P EST LOW 20 MIN: CPT | Mod: PBBFAC,TH,25 | Performed by: OBSTETRICS & GYNECOLOGY

## 2025-04-03 PROCEDURE — 84466 ASSAY OF TRANSFERRIN: CPT

## 2025-04-03 PROCEDURE — 99213 OFFICE O/P EST LOW 20 MIN: CPT | Mod: TH,25,S$PBB, | Performed by: OBSTETRICS & GYNECOLOGY

## 2025-04-03 PROCEDURE — 3008F BODY MASS INDEX DOCD: CPT | Mod: CPTII,,, | Performed by: OBSTETRICS & GYNECOLOGY

## 2025-04-03 PROCEDURE — 1159F MED LIST DOCD IN RCRD: CPT | Mod: CPTII,,, | Performed by: OBSTETRICS & GYNECOLOGY

## 2025-04-03 PROCEDURE — 82728 ASSAY OF FERRITIN: CPT

## 2025-04-03 PROCEDURE — 76821 MIDDLE CEREBRAL ARTERY ECHO: CPT | Mod: 26,59,S$PBB, | Performed by: OBSTETRICS & GYNECOLOGY

## 2025-04-03 PROCEDURE — 36415 COLL VENOUS BLD VENIPUNCTURE: CPT

## 2025-04-03 PROCEDURE — 99999 PR PBB SHADOW E&M-EST. PATIENT-LVL III: CPT | Mod: PBBFAC,,, | Performed by: OBSTETRICS & GYNECOLOGY

## 2025-04-03 PROCEDURE — 3074F SYST BP LT 130 MM HG: CPT | Mod: CPTII,,, | Performed by: OBSTETRICS & GYNECOLOGY

## 2025-04-03 PROCEDURE — 3078F DIAST BP <80 MM HG: CPT | Mod: CPTII,,, | Performed by: OBSTETRICS & GYNECOLOGY

## 2025-04-03 PROCEDURE — 99213 OFFICE O/P EST LOW 20 MIN: CPT | Mod: PBBFAC,TH | Performed by: OBSTETRICS & GYNECOLOGY

## 2025-04-03 PROCEDURE — 85025 COMPLETE CBC W/AUTO DIFF WBC: CPT

## 2025-04-03 PROCEDURE — 76816 OB US FOLLOW-UP PER FETUS: CPT | Mod: 26,59,S$PBB, | Performed by: OBSTETRICS & GYNECOLOGY

## 2025-04-03 NOTE — PROGRESS NOTES
Maternal Fetal Medicine follow up consult    SUBJECTIVE:     Amena Theodore is a 19 y.o.  female with IUP at 28w0d  who is seen in follow up consultation by MFM.  Pregnancy complications include:   Problem   Monochorionic Diamniotic Twin Gestation in Second Trimester   Early onset selective FGR, fetus 2       Previous notes reviewed.   No changes to medical, surgical, family, social, or obstetric history.    Interval history since last MFM visit: The patient reports adequate fetal movement. She denies leakage of fluid, vaginal bleeding, contractions.  The patient denies symptoms of pre-eclampsia including headache, blurred vision, right upper quadrant pain, chest pain, and shortness of breath.     Medications:  Reviewed     OBJECTIVE:     Blood Pressure: 111/71    Ultrasound performed. See viewpoint for full ultrasound report.  Monochorionic-diamniotic twin pregnancy with cardiac activity x 2  A = Bladder is visualized.  Normal MVP  UAD are normal with persistent forward flow (S/D  27%)  MCA dopplers are normal at 1.17 MoM  B = Bladder is visualized.  Normal MVP  UAD are normal with persistent forward flow (S/D  63%)  MCA dopplers are normal at 1.19 MoM    There are no signs of TTTS or TAPS.     ASSESSMENT/PLAN:     19 y.o.  female with IUP at 28w0d     Problems addressed at today's visit:  Monochorionic diamniotic twin gestation in second trimester  See prior counseling  Normal glucose screening  Fetal echocardiograms WNL x2    Recommendations:  1. Continue ASA 81 mg daily  2. Continue care with Dr. Nava    Early onset selective FGR, fetus 2  Early onset selective FGR, initially type III, now with persistently normal UA Dopplers, indicating type 1.  - Declined referral for possible intervention at that time.    Normal UA/MCA Dopplers again today, no evidence of TTTS or TAPS    Recommendations:  1. Continue weekly Doppler assessments with [facility] visits  2. Continue serial evaluations for TTTS and TAPS  3.  Interval growth assessment q3-4 weeks  4. Delivery timing previously recommended 32-34 weeks when the clinical picture was consistent with sFGR type III. Now with persistently normal UA Dopplers, may consider pushing delivery to the late  period (34-35 weeks). Delivery timing to be determined based on follow up ultrasound assessments.       Please see original MFM consultation for full details regarding management recommendations of these and other obstetric co-morbidities    FOLLOW UP:   F/u in 1 weeks for US/MFM visit    Today I have spent 20 minutes in the care of the patient. This includes face to face time and non-face to face time preparing to see the patient (eg, review of tests), obtaining and/or reviewing separately obtained history, documenting clinical information in the electronic or other health record, independently interpreting results and communicating results to the patient/family/caregiver, or care coordination.     Sole Thurston MD  Maternal Fetal Medicine

## 2025-04-03 NOTE — ASSESSMENT & PLAN NOTE
See prior counseling  Normal glucose screening  Fetal echocardiograms WNL x2    Recommendations:  1. Continue ASA 81 mg daily  2. Continue care with Dr. Nava

## 2025-04-07 ENCOUNTER — RESULTS FOLLOW-UP (OUTPATIENT)
Dept: OBSTETRICS AND GYNECOLOGY | Facility: CLINIC | Age: 20
End: 2025-04-07

## 2025-04-09 ENCOUNTER — ROUTINE PRENATAL (OUTPATIENT)
Dept: OBSTETRICS AND GYNECOLOGY | Facility: CLINIC | Age: 20
End: 2025-04-09
Payer: MEDICAID

## 2025-04-09 ENCOUNTER — CLINICAL SUPPORT (OUTPATIENT)
Dept: OBSTETRICS AND GYNECOLOGY | Facility: CLINIC | Age: 20
End: 2025-04-09
Payer: MEDICAID

## 2025-04-09 VITALS
BODY MASS INDEX: 23.28 KG/M2 | WEIGHT: 131.38 LBS | DIASTOLIC BLOOD PRESSURE: 76 MMHG | SYSTOLIC BLOOD PRESSURE: 114 MMHG

## 2025-04-09 DIAGNOSIS — Z3A.28 28 WEEKS GESTATION OF PREGNANCY: Primary | ICD-10-CM

## 2025-04-09 DIAGNOSIS — Z23 NEED FOR TDAP VACCINATION: Primary | ICD-10-CM

## 2025-04-09 PROCEDURE — 99999PBSHW TDAP VACCINE GREATER THAN OR EQUAL TO 7YO IM: Mod: PBBFAC,,,

## 2025-04-09 PROCEDURE — 99212 OFFICE O/P EST SF 10 MIN: CPT | Mod: PBBFAC | Performed by: NURSE PRACTITIONER

## 2025-04-09 PROCEDURE — 99999 PR PBB SHADOW E&M-EST. PATIENT-LVL I: CPT | Mod: PBBFAC,,,

## 2025-04-09 PROCEDURE — 90715 TDAP VACCINE 7 YRS/> IM: CPT | Mod: PBBFAC

## 2025-04-09 PROCEDURE — 99212 OFFICE O/P EST SF 10 MIN: CPT | Mod: TH,S$PBB,, | Performed by: NURSE PRACTITIONER

## 2025-04-09 PROCEDURE — 99211 OFF/OP EST MAY X REQ PHY/QHP: CPT | Mod: PBBFAC,27

## 2025-04-09 PROCEDURE — 99999 PR PBB SHADOW E&M-EST. PATIENT-LVL II: CPT | Mod: PBBFAC,,, | Performed by: NURSE PRACTITIONER

## 2025-04-09 NOTE — PROGRESS NOTES
Here for routine OB appt at 28w6d, with no complaints.  Reports good FM.  Denies LOF, denies VB, denies contractions.  Reviewed warning signs of Labor and Preeclampsia.  Daily FM counts reinforced.  New to me, twins! Both BOYS.  Gave [facility] peds list, class information  Baby shower 4/27  Passed glucose  Rh pos  Tdap today  RTC @ 30 weeks

## 2025-04-10 ENCOUNTER — PROCEDURE VISIT (OUTPATIENT)
Dept: MATERNAL FETAL MEDICINE | Facility: CLINIC | Age: 20
End: 2025-04-10
Payer: MEDICAID

## 2025-04-10 DIAGNOSIS — O36.5922 INTRAUTERINE GROWTH RESTRICTION, ANTEPARTUM, SECOND TRIMESTER, FETUS 2: ICD-10-CM

## 2025-04-10 DIAGNOSIS — O30.032 MONOCHORIONIC DIAMNIOTIC TWIN GESTATION IN SECOND TRIMESTER: ICD-10-CM

## 2025-04-10 PROCEDURE — 76820 UMBILICAL ARTERY ECHO: CPT | Mod: 26,59,S$PBB, | Performed by: STUDENT IN AN ORGANIZED HEALTH CARE EDUCATION/TRAINING PROGRAM

## 2025-04-10 PROCEDURE — 76821 MIDDLE CEREBRAL ARTERY ECHO: CPT | Mod: 59,PBBFAC | Performed by: STUDENT IN AN ORGANIZED HEALTH CARE EDUCATION/TRAINING PROGRAM

## 2025-04-10 PROCEDURE — 76816 OB US FOLLOW-UP PER FETUS: CPT | Mod: 26,59,S$PBB, | Performed by: STUDENT IN AN ORGANIZED HEALTH CARE EDUCATION/TRAINING PROGRAM

## 2025-04-17 ENCOUNTER — PROCEDURE VISIT (OUTPATIENT)
Dept: MATERNAL FETAL MEDICINE | Facility: CLINIC | Age: 20
End: 2025-04-17
Payer: MEDICAID

## 2025-04-17 ENCOUNTER — OFFICE VISIT (OUTPATIENT)
Dept: MATERNAL FETAL MEDICINE | Facility: CLINIC | Age: 20
End: 2025-04-17
Payer: MEDICAID

## 2025-04-17 ENCOUNTER — PATIENT MESSAGE (OUTPATIENT)
Dept: OTHER | Facility: OTHER | Age: 20
End: 2025-04-17
Payer: MEDICAID

## 2025-04-17 VITALS
BODY MASS INDEX: 21.86 KG/M2 | WEIGHT: 123.38 LBS | SYSTOLIC BLOOD PRESSURE: 107 MMHG | DIASTOLIC BLOOD PRESSURE: 73 MMHG | HEIGHT: 63 IN

## 2025-04-17 DIAGNOSIS — O36.5922 INTRAUTERINE GROWTH RESTRICTION, ANTEPARTUM, SECOND TRIMESTER, FETUS 2: Primary | ICD-10-CM

## 2025-04-17 DIAGNOSIS — O99.012 ANEMIA DURING PREGNANCY IN SECOND TRIMESTER: ICD-10-CM

## 2025-04-17 DIAGNOSIS — O36.5922 INTRAUTERINE GROWTH RESTRICTION, ANTEPARTUM, SECOND TRIMESTER, FETUS 2: ICD-10-CM

## 2025-04-17 DIAGNOSIS — O30.033 MONOCHORIONIC DIAMNIOTIC TWIN GESTATION IN THIRD TRIMESTER: ICD-10-CM

## 2025-04-17 DIAGNOSIS — D50.9 MATERNAL IRON DEFICIENCY ANEMIA AFFECTING PREGNANCY IN THIRD TRIMESTER, ANTEPARTUM: ICD-10-CM

## 2025-04-17 DIAGNOSIS — O99.013 MATERNAL IRON DEFICIENCY ANEMIA AFFECTING PREGNANCY IN THIRD TRIMESTER, ANTEPARTUM: ICD-10-CM

## 2025-04-17 DIAGNOSIS — O30.032 MONOCHORIONIC DIAMNIOTIC TWIN GESTATION IN SECOND TRIMESTER: ICD-10-CM

## 2025-04-17 PROCEDURE — 1159F MED LIST DOCD IN RCRD: CPT | Mod: CPTII,,, | Performed by: OBSTETRICS & GYNECOLOGY

## 2025-04-17 PROCEDURE — 3078F DIAST BP <80 MM HG: CPT | Mod: CPTII,,, | Performed by: OBSTETRICS & GYNECOLOGY

## 2025-04-17 PROCEDURE — 3074F SYST BP LT 130 MM HG: CPT | Mod: CPTII,,, | Performed by: OBSTETRICS & GYNECOLOGY

## 2025-04-17 PROCEDURE — 76821 MIDDLE CEREBRAL ARTERY ECHO: CPT | Mod: 26,59,S$PBB, | Performed by: OBSTETRICS & GYNECOLOGY

## 2025-04-17 PROCEDURE — 3008F BODY MASS INDEX DOCD: CPT | Mod: CPTII,,, | Performed by: OBSTETRICS & GYNECOLOGY

## 2025-04-17 PROCEDURE — 76816 OB US FOLLOW-UP PER FETUS: CPT | Mod: 26,59,S$PBB, | Performed by: OBSTETRICS & GYNECOLOGY

## 2025-04-17 PROCEDURE — 99214 OFFICE O/P EST MOD 30 MIN: CPT | Mod: S$PBB,TH,, | Performed by: OBSTETRICS & GYNECOLOGY

## 2025-04-17 PROCEDURE — 76819 FETAL BIOPHYS PROFIL W/O NST: CPT | Mod: 59,PBBFAC | Performed by: OBSTETRICS & GYNECOLOGY

## 2025-04-17 PROCEDURE — 99213 OFFICE O/P EST LOW 20 MIN: CPT | Mod: PBBFAC,TH | Performed by: OBSTETRICS & GYNECOLOGY

## 2025-04-17 PROCEDURE — 99999 PR PBB SHADOW E&M-EST. PATIENT-LVL III: CPT | Mod: PBBFAC,,, | Performed by: OBSTETRICS & GYNECOLOGY

## 2025-04-17 PROCEDURE — 76820 UMBILICAL ARTERY ECHO: CPT | Mod: 26,59,S$PBB, | Performed by: OBSTETRICS & GYNECOLOGY

## 2025-04-17 RX ORDER — ASPIRIN 81 MG/1
81 TABLET ORAL DAILY
Qty: 90 TABLET | Refills: 3 | Status: SHIPPED | OUTPATIENT
Start: 2025-04-17 | End: 2026-04-12

## 2025-04-17 RX ORDER — FERROUS SULFATE 324(65)MG
324 TABLET, DELAYED RELEASE (ENTERIC COATED) ORAL EVERY OTHER DAY
Qty: 45 TABLET | Refills: 1 | Status: SHIPPED | OUTPATIENT
Start: 2025-04-17 | End: 2025-10-14

## 2025-04-17 NOTE — ASSESSMENT & PLAN NOTE
Early onset selective FGR, initially type III, now with persistently normal UA Dopplers, consistent with type I.  - Declined referral for possible intervention at that time.    Stable FGR for twin B today, twin A AGA, 25% discordant  Normal UAD, normal MCAs/DVs for both  No evidence of TTTS or TAPS    Recommendations:  1. Continue weekly Doppler assessments with MFM visits  2. Continue serial evaluations for TTTS and TAPS  3. Interval growth assessment in 3 weeks  4. Delivery timing 35 weeks depending on continued reassuring fetal testing and stable Dopplers

## 2025-04-17 NOTE — PROGRESS NOTES
"Maternal Fetal Medicine follow up consult    SUBJECTIVE:     Amena Theodore is a 19 y.o.  female with IUP at 30w0d who is seen in follow up consultation by MFM.  Pregnancy complications include:   Problem   Maternal Iron Deficiency Anemia Affecting Pregnancy in Third Trimester, Antepartum   Monochorionic Diamniotic Twin Gestation in Third Trimester   Early onset selective FGR, fetus 2       Previous notes reviewed.   No changes to medical, surgical, family, social, or obstetric history.    Interval history since last MFM visit: Doing well, no complaints. Taking her po iron every other day, needs refill.     Medications reviewed.    Care team members:  Dr. Nava - Primary OB     OBJECTIVE:   /73 (BP Location: Left arm, Patient Position: Sitting)   Ht 5' 3" (1.6 m)   Wt 56 kg (123 lb 5.6 oz)   LMP 2024   BMI 21.85 kg/m²     Ultrasound performed. See viewpoint for full ultrasound report.  1. Monochorionic-diamniotic twin pregnancy   2. Twin A  - Fetal size is AGA with EFW 1357 g (26%).  AC is at the 26%.  - Normal repeat limited fetal anatomic survey.   - Bladder is visualized.  - BPP is normal at 8/8 and MVP is normal amount.  - Umbilical artery end diastolic flow is normal, and SD ratio is 74%.  - Middle cerebral artery Doppler peak systolic velocity measures normal at 1.14 MoM.  - Ductus venosus waveforms appear normal.   - Presentation is cephalic right lower, presenting.  3. Twin B  - Selective fetal growth restriction is again observed with EFW 1019 g(8%) and  AC at the <1%.  - Normal repeat limited fetal anatomic survey.   - Bladder is visualized.  - BPP is normal at 8/8 and MVP is normal amount.  - Umbilical artery end diastolic flow is normal, and SD ratio is 66%.  - Middle cerebral artery Doppler peak systolic velocity measures normal at 1.01 MoM.  - Ductus venosus waveforms appear normal.   - Presentation is cephalic left upper, non-presenting.  4. Growth discordance is 24.9 %.   5. There " are no signs of TTTS or TAPS.    Significant labs/imaging:  Lab Results   Component Value Date    WBC 7.86 2025    HGB 9.8 (L) 2025    HCT 30.5 (L) 2025    MCV 92 2025     2025       Lab Results   Component Value Date    IRON 119 2025    TRANSFERRIN 335 2025    TIBC 496 (H) 2025    LABIRON 24 2025          ASSESSMENT/PLAN:     19 y.o.  female with IUP at 30w0d    Early onset selective FGR, fetus 2  Early onset selective FGR, initially type III, now with persistently normal UA Dopplers, consistent with type I.  - Declined referral for possible intervention at that time.    Stable FGR for twin B today, twin A AGA, 25% discordant  Normal UAD, normal MCAs/DVs for both  No evidence of TTTS or TAPS    Recommendations:  1. Continue weekly Doppler assessments with MFM visits  2. Continue serial evaluations for TTTS and TAPS  3. Interval growth assessment in 3 weeks  4. Delivery timing 35 weeks depending on continued reassuring fetal testing and stable Dopplers    Monochorionic diamniotic twin gestation in third trimester  See prior counseling    Recommendations:  1. Continue ASA 81 mg daily  2. Continue care with Dr. Nava    Maternal iron deficiency anemia affecting pregnancy in third trimester, antepartum  Iron deficient as above  Midtrimester H/H (3/12/25): 9.5/28%  Last H/H (4/3/25): 9.8/31%  Current regimen:   - Fe Sulfate daily    Recommendations:  1. Given persistent iron deficiency anemia, consider parenteral iron (primary team to coordinate)    F/u in 1 weeks for MFM visit  F/u in 1 weeks for US      JUANA Beasley MD/MPH  Maternal Fetal Medicine

## 2025-04-17 NOTE — ASSESSMENT & PLAN NOTE
See prior counseling    Recommendations:  1. Continue ASA 81 mg daily  2. Continue care with Dr. Nava

## 2025-04-17 NOTE — ASSESSMENT & PLAN NOTE
Iron deficient as above  Midtrimester H/H (3/12/25): 9.5/28%  Last H/H (4/3/25): 9.8/31%  Current regimen:   - Fe Sulfate daily    Recommendations:  1. Given persistent iron deficiency anemia, consider parenteral iron (primary team to coordinate)

## 2025-04-23 ENCOUNTER — ROUTINE PRENATAL (OUTPATIENT)
Dept: OBSTETRICS AND GYNECOLOGY | Facility: CLINIC | Age: 20
End: 2025-04-23
Payer: MEDICAID

## 2025-04-23 ENCOUNTER — LAB VISIT (OUTPATIENT)
Dept: LAB | Facility: OTHER | Age: 20
End: 2025-04-23
Attending: STUDENT IN AN ORGANIZED HEALTH CARE EDUCATION/TRAINING PROGRAM
Payer: MEDICAID

## 2025-04-23 VITALS
BODY MASS INDEX: 24.68 KG/M2 | DIASTOLIC BLOOD PRESSURE: 82 MMHG | WEIGHT: 139.31 LBS | SYSTOLIC BLOOD PRESSURE: 102 MMHG

## 2025-04-23 DIAGNOSIS — O30.032 MONOCHORIONIC DIAMNIOTIC TWIN GESTATION IN SECOND TRIMESTER: Primary | ICD-10-CM

## 2025-04-23 DIAGNOSIS — O30.039 MONOCHORIONIC DIAMNIOTIC TWIN PREGNANCY, ANTEPARTUM: ICD-10-CM

## 2025-04-23 LAB
ABSOLUTE EOSINOPHIL (OHS): 0.06 K/UL
ABSOLUTE MONOCYTE (OHS): 0.55 K/UL (ref 0.3–1)
ABSOLUTE NEUTROPHIL COUNT (OHS): 4.86 K/UL (ref 1.8–7.7)
BASOPHILS # BLD AUTO: 0.02 K/UL
BASOPHILS NFR BLD AUTO: 0.3 %
ERYTHROCYTE [DISTWIDTH] IN BLOOD BY AUTOMATED COUNT: 13.2 % (ref 11.5–14.5)
HCT VFR BLD AUTO: 29 % (ref 37–48.5)
HGB BLD-MCNC: 9.7 GM/DL (ref 12–16)
HIV 1+2 AB+HIV1 P24 AG SERPL QL IA: NEGATIVE
IMM GRANULOCYTES # BLD AUTO: 0.02 K/UL (ref 0–0.04)
IMM GRANULOCYTES NFR BLD AUTO: 0.3 % (ref 0–0.5)
LYMPHOCYTES # BLD AUTO: 1.38 K/UL (ref 1–4.8)
MCH RBC QN AUTO: 30.3 PG (ref 27–31)
MCHC RBC AUTO-ENTMCNC: 33.4 G/DL (ref 32–36)
MCV RBC AUTO: 91 FL (ref 82–98)
NUCLEATED RBC (/100WBC) (OHS): 0 /100 WBC
PLATELET # BLD AUTO: 226 K/UL (ref 150–450)
PMV BLD AUTO: 10.3 FL (ref 9.2–12.9)
RBC # BLD AUTO: 3.2 M/UL (ref 4–5.4)
RELATIVE EOSINOPHIL (OHS): 0.9 %
RELATIVE LYMPHOCYTE (OHS): 20 % (ref 18–48)
RELATIVE MONOCYTE (OHS): 8 % (ref 4–15)
RELATIVE NEUTROPHIL (OHS): 70.5 % (ref 38–73)
T PALLIDUM IGG+IGM SER QL: NEGATIVE
WBC # BLD AUTO: 6.89 K/UL (ref 3.9–12.7)

## 2025-04-23 PROCEDURE — 99213 OFFICE O/P EST LOW 20 MIN: CPT | Mod: TH,S$PBB,, | Performed by: STUDENT IN AN ORGANIZED HEALTH CARE EDUCATION/TRAINING PROGRAM

## 2025-04-23 PROCEDURE — 87389 HIV-1 AG W/HIV-1&-2 AB AG IA: CPT

## 2025-04-23 PROCEDURE — 86593 SYPHILIS TEST NON-TREP QUANT: CPT

## 2025-04-23 PROCEDURE — 99213 OFFICE O/P EST LOW 20 MIN: CPT | Mod: PBBFAC,TH | Performed by: STUDENT IN AN ORGANIZED HEALTH CARE EDUCATION/TRAINING PROGRAM

## 2025-04-23 PROCEDURE — 99999 PR PBB SHADOW E&M-EST. PATIENT-LVL III: CPT | Mod: PBBFAC,,, | Performed by: STUDENT IN AN ORGANIZED HEALTH CARE EDUCATION/TRAINING PROGRAM

## 2025-04-23 PROCEDURE — 36415 COLL VENOUS BLD VENIPUNCTURE: CPT

## 2025-04-23 PROCEDURE — 85025 COMPLETE CBC W/AUTO DIFF WBC: CPT

## 2025-04-23 PROCEDURE — 87653 STREP B DNA AMP PROBE: CPT | Performed by: STUDENT IN AN ORGANIZED HEALTH CARE EDUCATION/TRAINING PROGRAM

## 2025-04-23 NOTE — PATIENT INSTRUCTIONS
[facility], Labor and Delivery is on the 6th floor of [facility]: [phone]    [email or web address]

## 2025-04-23 NOTE — PROGRESS NOTES
Pt doing well. Denies vaginal bleeding, LOF, contractions. Reports regular fetal movement. Denies symptoms of pre E.  Reviewed recs for timing of delivery in setting of mono/di twins with FGR. Date chosen for 35th week. Reviewed possible outcomes of vaginal vs CS and expectations. Pt opts for scheduled 1LTCS.   Reviewed consents. Signed.  Reviewed routines/expectations on L&D/MBU  Labor and pre E precautions reviewed.   All questions answered  Iron studies ordered with 3T labs  GBS collected   RTC: 2 weeks

## 2025-04-24 ENCOUNTER — PROCEDURE VISIT (OUTPATIENT)
Dept: MATERNAL FETAL MEDICINE | Facility: CLINIC | Age: 20
End: 2025-04-24
Payer: MEDICAID

## 2025-04-24 ENCOUNTER — LAB VISIT (OUTPATIENT)
Dept: LAB | Facility: OTHER | Age: 20
End: 2025-04-24
Attending: STUDENT IN AN ORGANIZED HEALTH CARE EDUCATION/TRAINING PROGRAM
Payer: MEDICAID

## 2025-04-24 ENCOUNTER — OFFICE VISIT (OUTPATIENT)
Dept: MATERNAL FETAL MEDICINE | Facility: CLINIC | Age: 20
End: 2025-04-24
Payer: MEDICAID

## 2025-04-24 VITALS — WEIGHT: 139 LBS | SYSTOLIC BLOOD PRESSURE: 121 MMHG | BODY MASS INDEX: 24.62 KG/M2 | DIASTOLIC BLOOD PRESSURE: 74 MMHG

## 2025-04-24 DIAGNOSIS — O36.5922 INTRAUTERINE GROWTH RESTRICTION, ANTEPARTUM, SECOND TRIMESTER, FETUS 2: ICD-10-CM

## 2025-04-24 DIAGNOSIS — O30.032 MONOCHORIONIC DIAMNIOTIC TWIN GESTATION IN SECOND TRIMESTER: ICD-10-CM

## 2025-04-24 DIAGNOSIS — Z36.89 ENCOUNTER FOR ULTRASOUND TO ASSESS FETAL GROWTH: Primary | ICD-10-CM

## 2025-04-24 DIAGNOSIS — O99.012 ANEMIA DURING PREGNANCY IN SECOND TRIMESTER: ICD-10-CM

## 2025-04-24 LAB
FERRITIN SERPL-MCNC: 25 NG/ML (ref 20–300)
IRON SATN MFR SERPL: 15 % (ref 20–50)
IRON SERPL-MCNC: 81 UG/DL (ref 30–160)
TIBC SERPL-MCNC: 533 UG/DL (ref 250–450)
TRANSFERRIN SERPL-MCNC: 360 MG/DL (ref 200–375)

## 2025-04-24 PROCEDURE — 99999 PR PBB SHADOW E&M-EST. PATIENT-LVL II: CPT | Mod: PBBFAC,,, | Performed by: OBSTETRICS & GYNECOLOGY

## 2025-04-24 PROCEDURE — 84466 ASSAY OF TRANSFERRIN: CPT

## 2025-04-24 PROCEDURE — 76816 OB US FOLLOW-UP PER FETUS: CPT | Mod: 59,PBBFAC | Performed by: OBSTETRICS & GYNECOLOGY

## 2025-04-24 PROCEDURE — 99212 OFFICE O/P EST SF 10 MIN: CPT | Mod: PBBFAC,TH,25 | Performed by: OBSTETRICS & GYNECOLOGY

## 2025-04-24 PROCEDURE — 82728 ASSAY OF FERRITIN: CPT

## 2025-04-24 PROCEDURE — 76820 UMBILICAL ARTERY ECHO: CPT | Mod: 59,PBBFAC | Performed by: OBSTETRICS & GYNECOLOGY

## 2025-04-24 PROCEDURE — 99212 OFFICE O/P EST SF 10 MIN: CPT | Mod: PBBFAC,TH | Performed by: OBSTETRICS & GYNECOLOGY

## 2025-04-24 PROCEDURE — 36415 COLL VENOUS BLD VENIPUNCTURE: CPT

## 2025-04-24 PROCEDURE — 76821 MIDDLE CEREBRAL ARTERY ECHO: CPT | Mod: 59,PBBFAC | Performed by: OBSTETRICS & GYNECOLOGY

## 2025-04-24 NOTE — ASSESSMENT & PLAN NOTE
Early onset selective FGR, initially type III, now with persistently normal UA Dopplers, consistent with type I.  - Declined referral for possible intervention at time or initial concern for worse sFGR    Stable sFGR for twin B with most recent growth US, twin A AGA, 25% discordant  Normal UAD, normal MCAs/DVs for both today  No evidence of TTTS or TAPS    Recommendations:  Continue weekly Doppler assessments with MFM visits  Continue serial evaluations for TTTS and TAPS  PNT Twice weekly (1/ week in PNT, 1/week up in MFM)  Interval growth assessment in 3 weeks from prior  Delivery timing 35 weeks if remains stable.

## 2025-04-24 NOTE — PROGRESS NOTES
Maternal Fetal Medicine follow up consult    Patient is accompanied by her partner     SUBJECTIVE:     Amena Theodore is a 19 y.o.  female with IUP at 31w0d who is seen in follow up consultation by MFM.  Pregnancy complications include:   No problems updated.  Previous notes reviewed.   No changes to medical, surgical, family, social, or obstetric history.    Interval history since last MFM visit:   Patient has no complaints today. She is overall feeling well.  Patient denies any contractions/cramping, vaginal bleeding or leakage of fluid.  She reports good fetal movement x 2    Medications:  Current Outpatient Medications   Medication Instructions    aspirin (ECOTRIN) 81 mg, Oral, Daily    ferrous sulfate 324 mg, Oral, Every other day    prenatal vit no.124/iron/folic (PRENATAL VITAMIN ORAL) Take by mouth.     Care team members:  [facility] - Primary [facility]     OBJECTIVE:   /74 (Patient Position: Sitting)   Wt 63 kg (139 lb)   LMP 2024   BMI 24.62 kg/m²     Physical Exam:  Deferred    Ultrasound performed. See viewpoint for full ultrasound report.  Monochorionic-diamniotic twin pregnancy with cardiac activity x 2  A = Bladder is visualized.  BPP 8/8 with normal MVP  UAD are normal with persistent forward flow (S/D 52%)  MCA dopplers are normal at 1.32 MoM  B = Bladder is visualized.  BPP 8/8 with normal MVP  UAD are normal with persistent forward flow (S/D 73%)  MCA dopplers are normal at 1.1 MoM     There are no signs of TTTS or TAPS.   Known sFGR Type 1.    Significant labs/imaging:  Lab Results   Component Value Date    ICY09D38 Negative 2025    T18 Negative 2025    QGC24R59 Negative 2025    MONOSOMYXRES Comment 2025       ASSESSMENT/PLAN:     19 y.o.  female with IUP at 31w0d    Assessment & Plan  Intrauterine growth restriction, antepartum, second trimester, fetus 2  Early onset selective FGR, initially type III, now with persistently normal UA Dopplers, consistent with  type I.  - Declined referral for possible intervention at time or initial concern for worse sFGR    Stable sFGR for twin B with most recent growth US, twin A AGA, 25% discordant  Normal UAD, normal MCAs/DVs for both today  No evidence of TTTS or TAPS    Recommendations:  Continue weekly Doppler assessments with MFM visits  Continue serial evaluations for TTTS and TAPS  PNT Twice weekly (1/ week in PNT, 1/week up in MFM)  Interval growth assessment in 3 weeks from prior  Delivery timing 35 weeks if remains stable.    Monochorionic diamniotic twin gestation in second trimester  Please see original consult for full counseling and recommendations       Patient was counseled that prenatal ultrasound studies have limitations. They do not detect all fetal, genetic, placental, and maternal abnormalities.     FOLLOW UP:   A follow up ultrasound will be made for 1 week from today with a follow up MFM MD visit.    The patient was given an opportunity to ask questions about the management of her high risk pregnancy problems. She expressed an understanding of and agreement to the above impression and plan. All questions were answered to her satisfaction.    20 minutes of total time spent on the encounter, which includes face to face time and non-face to face time preparing to see the patient (eg, review of tests), obtaining and/or reviewing separately obtained history, documenting clinical information in the electronic or other health record, independently interpreting results (not separately reported) and communicating results to the patient/family/caregiver, or care coordination (not separately reported).        John Dumont MD   Maternal-Fetal Medicine      Electronically Signed by John Dumont April 24, 2025

## 2025-04-25 ENCOUNTER — RESULTS FOLLOW-UP (OUTPATIENT)
Dept: OBSTETRICS AND GYNECOLOGY | Facility: CLINIC | Age: 20
End: 2025-04-25

## 2025-04-26 LAB — GROUP B STREPTOCOCCUS, PCR (OHS): NEGATIVE

## 2025-04-28 ENCOUNTER — HOSPITAL ENCOUNTER (OUTPATIENT)
Dept: PERINATAL CARE | Facility: OTHER | Age: 20
Discharge: HOME | End: 2025-04-28
Attending: STUDENT IN AN ORGANIZED HEALTH CARE EDUCATION/TRAINING PROGRAM
Payer: MEDICAID

## 2025-04-28 DIAGNOSIS — O36.5912: ICD-10-CM

## 2025-04-28 DIAGNOSIS — O30.039 MONOCHORIONIC DIAMNIOTIC TWIN PREGNANCY, ANTEPARTUM: ICD-10-CM

## 2025-04-28 PROCEDURE — 59025 FETAL NON-STRESS TEST: CPT

## 2025-04-28 PROCEDURE — 59025 FETAL NON-STRESS TEST: CPT | Mod: 26,59,, | Performed by: OBSTETRICS & GYNECOLOGY

## 2025-04-29 RX ORDER — SODIUM CHLORIDE 0.9 % (FLUSH) 0.9 %
10 SYRINGE (ML) INJECTION
OUTPATIENT
Start: 2025-04-29

## 2025-04-29 RX ORDER — SODIUM FERRIC GLUCONATE COMPLEX IN SUCROSE 12.5 MG/ML
125 INJECTION INTRAVENOUS
OUTPATIENT
Start: 2025-04-29

## 2025-04-29 RX ORDER — HEPARIN 100 UNIT/ML
500 SYRINGE INTRAVENOUS
OUTPATIENT
Start: 2025-04-29

## 2025-04-29 RX ORDER — EPINEPHRINE 0.3 MG/.3ML
0.3 INJECTION SUBCUTANEOUS ONCE AS NEEDED
OUTPATIENT
Start: 2025-04-29

## 2025-05-01 ENCOUNTER — OFFICE VISIT (OUTPATIENT)
Dept: MATERNAL FETAL MEDICINE | Facility: CLINIC | Age: 20
End: 2025-05-01
Payer: MEDICAID

## 2025-05-01 ENCOUNTER — PATIENT MESSAGE (OUTPATIENT)
Dept: OTHER | Facility: OTHER | Age: 20
End: 2025-05-01
Payer: MEDICAID

## 2025-05-01 ENCOUNTER — PROCEDURE VISIT (OUTPATIENT)
Dept: MATERNAL FETAL MEDICINE | Facility: CLINIC | Age: 20
End: 2025-05-01
Payer: MEDICAID

## 2025-05-01 VITALS — WEIGHT: 141 LBS | SYSTOLIC BLOOD PRESSURE: 118 MMHG | DIASTOLIC BLOOD PRESSURE: 71 MMHG | BODY MASS INDEX: 24.98 KG/M2

## 2025-05-01 DIAGNOSIS — Z36.89 ENCOUNTER FOR ULTRASOUND TO ASSESS FETAL GROWTH: ICD-10-CM

## 2025-05-01 DIAGNOSIS — O36.5922 INTRAUTERINE GROWTH RESTRICTION, ANTEPARTUM, SECOND TRIMESTER, FETUS 2: Primary | ICD-10-CM

## 2025-05-01 DIAGNOSIS — O36.5922 INTRAUTERINE GROWTH RESTRICTION, ANTEPARTUM, SECOND TRIMESTER, FETUS 2: ICD-10-CM

## 2025-05-01 DIAGNOSIS — O99.012 ANEMIA DURING PREGNANCY IN SECOND TRIMESTER: ICD-10-CM

## 2025-05-01 DIAGNOSIS — O30.032 MONOCHORIONIC DIAMNIOTIC TWIN GESTATION IN SECOND TRIMESTER: ICD-10-CM

## 2025-05-01 PROCEDURE — 3074F SYST BP LT 130 MM HG: CPT | Mod: CPTII,,, | Performed by: STUDENT IN AN ORGANIZED HEALTH CARE EDUCATION/TRAINING PROGRAM

## 2025-05-01 PROCEDURE — 3008F BODY MASS INDEX DOCD: CPT | Mod: CPTII,,, | Performed by: STUDENT IN AN ORGANIZED HEALTH CARE EDUCATION/TRAINING PROGRAM

## 2025-05-01 PROCEDURE — 99999 PR PBB SHADOW E&M-EST. PATIENT-LVL II: CPT | Mod: PBBFAC,,, | Performed by: STUDENT IN AN ORGANIZED HEALTH CARE EDUCATION/TRAINING PROGRAM

## 2025-05-01 PROCEDURE — 76821 MIDDLE CEREBRAL ARTERY ECHO: CPT | Mod: 26,59,S$PBB, | Performed by: STUDENT IN AN ORGANIZED HEALTH CARE EDUCATION/TRAINING PROGRAM

## 2025-05-01 PROCEDURE — 76819 FETAL BIOPHYS PROFIL W/O NST: CPT | Mod: 26,59,S$PBB, | Performed by: STUDENT IN AN ORGANIZED HEALTH CARE EDUCATION/TRAINING PROGRAM

## 2025-05-01 PROCEDURE — 99213 OFFICE O/P EST LOW 20 MIN: CPT | Mod: S$PBB,TH,, | Performed by: STUDENT IN AN ORGANIZED HEALTH CARE EDUCATION/TRAINING PROGRAM

## 2025-05-01 PROCEDURE — 76820 UMBILICAL ARTERY ECHO: CPT | Mod: 59,PBBFAC | Performed by: STUDENT IN AN ORGANIZED HEALTH CARE EDUCATION/TRAINING PROGRAM

## 2025-05-01 PROCEDURE — 3078F DIAST BP <80 MM HG: CPT | Mod: CPTII,,, | Performed by: STUDENT IN AN ORGANIZED HEALTH CARE EDUCATION/TRAINING PROGRAM

## 2025-05-01 PROCEDURE — 99212 OFFICE O/P EST SF 10 MIN: CPT | Mod: PBBFAC,25,TH | Performed by: STUDENT IN AN ORGANIZED HEALTH CARE EDUCATION/TRAINING PROGRAM

## 2025-05-01 NOTE — PROGRESS NOTES
Maternal Fetal Medicine follow up consult    SUBJECTIVE:     Amena Theodore is a 19 y.o.  female with IUP at 32w0d who is seen in follow up consultation by MFM.  Pregnancy complications include:   Problem   Early onset selective FGR, fetus 2       Previous notes reviewed.   No changes to medical, surgical, family, social, or obstetric history.    Interval history since last MFM visit:   Doing well, no changes. CS scheduled.     Medications reviewed.    Care team members:  Dr. Nava - Primary OB     OBJECTIVE:   /71 (BP Location: Left arm, Patient Position: Sitting)   Wt 64 kg (141 lb)   LMP 2024   BMI 24.98 kg/m²     Ultrasound performed. See viewpoint for full ultrasound report.  Monochorionic-diamniotic twin pregnancy with cardiac activity x 2  A = Bladder is visualized.  BPP 8/8 with normal MVP  UAD are normal with persistent forward flow (S/D 39%)  MCA dopplers are normal at 1.08 MoM  B = Bladder is visualized.  BPP 8/8 with normal MVP  UAD are normal with persistent forward flow (S/D 77%)  MCA dopplers are normal at 1.16 MoM     There are no signs of TTTS or TAPS.   Known sFGR type 1    Significant labs/imaging:  None    ASSESSMENT/PLAN:     19 y.o.  female with IUP at 32w0d    Early onset selective FGR, fetus 2  Early onset selective FGR, initially type III, now with persistently normal UA Dopplers, consistent with type I.  - Declined referral for possible intervention at time or initial concern for worse sFGR    Stable sFGR for twin B with most recent growth US, twin A AGA, 25% discordant (4/17)  Normal UAD, normal MCAs for both today  No evidence of TTTS or TAPS    Recommendations:  Continue weekly Doppler assessments with MFM visits  Continue serial evaluations for TTTS and TAPS  PNT Twice weekly (1/ week in PNT, 1/week up in MFM)  Interval growth assessment in 3 weeks from prior  Delivery timing 35 weeks if remains stable.      F/u in 1 weeks for MFM visit    Juani Hall  MD  PGY 7  Maternal Fetal Medicine  [facility]

## 2025-05-05 ENCOUNTER — HOSPITAL ENCOUNTER (OUTPATIENT)
Dept: PERINATAL CARE | Facility: OTHER | Age: 20
Discharge: HOME | End: 2025-05-05
Attending: STUDENT IN AN ORGANIZED HEALTH CARE EDUCATION/TRAINING PROGRAM
Payer: MEDICAID

## 2025-05-05 DIAGNOSIS — O36.5912: ICD-10-CM

## 2025-05-05 DIAGNOSIS — O30.039 MONOCHORIONIC DIAMNIOTIC TWIN PREGNANCY, ANTEPARTUM: ICD-10-CM

## 2025-05-05 PROCEDURE — 59025 FETAL NON-STRESS TEST: CPT

## 2025-05-05 PROCEDURE — 59025 FETAL NON-STRESS TEST: CPT | Mod: 26,59,, | Performed by: STUDENT IN AN ORGANIZED HEALTH CARE EDUCATION/TRAINING PROGRAM

## 2025-05-07 ENCOUNTER — LAB VISIT (OUTPATIENT)
Dept: LAB | Facility: OTHER | Age: 20
End: 2025-05-07
Attending: NURSE PRACTITIONER
Payer: MEDICAID

## 2025-05-07 ENCOUNTER — TELEPHONE (OUTPATIENT)
Dept: OBSTETRICS AND GYNECOLOGY | Facility: CLINIC | Age: 20
End: 2025-05-07

## 2025-05-07 ENCOUNTER — INFUSION (OUTPATIENT)
Dept: INFUSION THERAPY | Facility: OTHER | Age: 20
End: 2025-05-07
Attending: STUDENT IN AN ORGANIZED HEALTH CARE EDUCATION/TRAINING PROGRAM
Payer: MEDICAID

## 2025-05-07 ENCOUNTER — RESULTS FOLLOW-UP (OUTPATIENT)
Dept: OBSTETRICS AND GYNECOLOGY | Facility: CLINIC | Age: 20
End: 2025-05-07

## 2025-05-07 ENCOUNTER — ROUTINE PRENATAL (OUTPATIENT)
Dept: OBSTETRICS AND GYNECOLOGY | Facility: CLINIC | Age: 20
End: 2025-05-07
Payer: MEDICAID

## 2025-05-07 VITALS
DIASTOLIC BLOOD PRESSURE: 71 MMHG | RESPIRATION RATE: 16 BRPM | TEMPERATURE: 99 F | HEART RATE: 77 BPM | OXYGEN SATURATION: 100 % | SYSTOLIC BLOOD PRESSURE: 124 MMHG

## 2025-05-07 VITALS
WEIGHT: 146.38 LBS | SYSTOLIC BLOOD PRESSURE: 128 MMHG | DIASTOLIC BLOOD PRESSURE: 90 MMHG | BODY MASS INDEX: 25.93 KG/M2

## 2025-05-07 DIAGNOSIS — D50.9 MATERNAL IRON DEFICIENCY ANEMIA AFFECTING PREGNANCY IN THIRD TRIMESTER, ANTEPARTUM: Primary | ICD-10-CM

## 2025-05-07 DIAGNOSIS — Z3A.32 32 WEEKS GESTATION OF PREGNANCY: Primary | ICD-10-CM

## 2025-05-07 DIAGNOSIS — O99.013 MATERNAL IRON DEFICIENCY ANEMIA AFFECTING PREGNANCY IN THIRD TRIMESTER, ANTEPARTUM: Primary | ICD-10-CM

## 2025-05-07 DIAGNOSIS — Z3A.32 32 WEEKS GESTATION OF PREGNANCY: ICD-10-CM

## 2025-05-07 DIAGNOSIS — O99.019 ANEMIA AFFECTING FIRST PREGNANCY: Primary | ICD-10-CM

## 2025-05-07 LAB
ABSOLUTE EOSINOPHIL (OHS): 0.03 K/UL
ABSOLUTE MONOCYTE (OHS): 0.53 K/UL (ref 0.3–1)
ABSOLUTE NEUTROPHIL COUNT (OHS): 6.41 K/UL (ref 1.8–7.7)
ALBUMIN SERPL BCP-MCNC: 2.6 G/DL (ref 3.5–5.2)
ALP SERPL-CCNC: 139 UNIT/L (ref 40–150)
ALT SERPL W/O P-5'-P-CCNC: 8 UNIT/L (ref 10–44)
ANION GAP (OHS): 8 MMOL/L (ref 8–16)
AST SERPL-CCNC: 16 UNIT/L (ref 11–45)
BASOPHILS # BLD AUTO: 0.01 K/UL
BASOPHILS NFR BLD AUTO: 0.1 %
BILIRUB SERPL-MCNC: 0.4 MG/DL (ref 0.1–1)
BUN SERPL-MCNC: 7 MG/DL (ref 6–20)
CALCIUM SERPL-MCNC: 8.9 MG/DL (ref 8.7–10.5)
CHLORIDE SERPL-SCNC: 107 MMOL/L (ref 95–110)
CO2 SERPL-SCNC: 23 MMOL/L (ref 23–29)
CREAT SERPL-MCNC: 0.6 MG/DL (ref 0.5–1.4)
CREAT UR-MCNC: 54.5 MG/DL (ref 15–325)
ERYTHROCYTE [DISTWIDTH] IN BLOOD BY AUTOMATED COUNT: 13.3 % (ref 11.5–14.5)
GFR SERPLBLD CREATININE-BSD FMLA CKD-EPI: >60 ML/MIN/1.73/M2
GLUCOSE SERPL-MCNC: 71 MG/DL (ref 70–110)
HCT VFR BLD AUTO: 34.3 % (ref 37–48.5)
HGB BLD-MCNC: 11 GM/DL (ref 12–16)
IMM GRANULOCYTES # BLD AUTO: 0.03 K/UL (ref 0–0.04)
IMM GRANULOCYTES NFR BLD AUTO: 0.3 % (ref 0–0.5)
LYMPHOCYTES # BLD AUTO: 1.69 K/UL (ref 1–4.8)
MCH RBC QN AUTO: 29.6 PG (ref 27–31)
MCHC RBC AUTO-ENTMCNC: 32.1 G/DL (ref 32–36)
MCV RBC AUTO: 92 FL (ref 82–98)
NUCLEATED RBC (/100WBC) (OHS): 0 /100 WBC
PLATELET # BLD AUTO: 221 K/UL (ref 150–450)
PMV BLD AUTO: 10.8 FL (ref 9.2–12.9)
POTASSIUM SERPL-SCNC: 3.8 MMOL/L (ref 3.5–5.1)
PROT SERPL-MCNC: 6.5 GM/DL (ref 6–8.4)
PROT UR-MCNC: 9 MG/DL
PROT/CREAT UR: 0.17 MG/G{CREAT}
RBC # BLD AUTO: 3.72 M/UL (ref 4–5.4)
RELATIVE EOSINOPHIL (OHS): 0.3 %
RELATIVE LYMPHOCYTE (OHS): 19.4 % (ref 18–48)
RELATIVE MONOCYTE (OHS): 6.1 % (ref 4–15)
RELATIVE NEUTROPHIL (OHS): 73.8 % (ref 38–73)
SODIUM SERPL-SCNC: 138 MMOL/L (ref 136–145)
WBC # BLD AUTO: 8.7 K/UL (ref 3.9–12.7)

## 2025-05-07 PROCEDURE — 25000003 PHARM REV CODE 250: Performed by: STUDENT IN AN ORGANIZED HEALTH CARE EDUCATION/TRAINING PROGRAM

## 2025-05-07 PROCEDURE — 99213 OFFICE O/P EST LOW 20 MIN: CPT | Mod: PBBFAC,25 | Performed by: NURSE PRACTITIONER

## 2025-05-07 PROCEDURE — 99212 OFFICE O/P EST SF 10 MIN: CPT | Mod: TH,S$PBB,, | Performed by: NURSE PRACTITIONER

## 2025-05-07 PROCEDURE — 99999 PR PBB SHADOW E&M-EST. PATIENT-LVL III: CPT | Mod: PBBFAC,,, | Performed by: NURSE PRACTITIONER

## 2025-05-07 PROCEDURE — 63600175 PHARM REV CODE 636 W HCPCS: Performed by: STUDENT IN AN ORGANIZED HEALTH CARE EDUCATION/TRAINING PROGRAM

## 2025-05-07 PROCEDURE — 36415 COLL VENOUS BLD VENIPUNCTURE: CPT

## 2025-05-07 PROCEDURE — 82570 ASSAY OF URINE CREATININE: CPT

## 2025-05-07 PROCEDURE — 96374 THER/PROPH/DIAG INJ IV PUSH: CPT

## 2025-05-07 PROCEDURE — 84460 ALANINE AMINO (ALT) (SGPT): CPT

## 2025-05-07 PROCEDURE — 85025 COMPLETE CBC W/AUTO DIFF WBC: CPT

## 2025-05-07 RX ORDER — SODIUM CHLORIDE 0.9 % (FLUSH) 0.9 %
10 SYRINGE (ML) INJECTION
OUTPATIENT
Start: 2025-05-14

## 2025-05-07 RX ORDER — HEPARIN 100 UNIT/ML
500 SYRINGE INTRAVENOUS
Status: DISCONTINUED | OUTPATIENT
Start: 2025-05-07 | End: 2025-05-07 | Stop reason: HOSPADM

## 2025-05-07 RX ORDER — SODIUM CHLORIDE 0.9 % (FLUSH) 0.9 %
10 SYRINGE (ML) INJECTION
Status: DISCONTINUED | OUTPATIENT
Start: 2025-05-07 | End: 2025-05-07 | Stop reason: HOSPADM

## 2025-05-07 RX ORDER — HEPARIN 100 UNIT/ML
500 SYRINGE INTRAVENOUS
OUTPATIENT
Start: 2025-05-14

## 2025-05-07 RX ORDER — EPINEPHRINE 0.3 MG/.3ML
0.3 INJECTION SUBCUTANEOUS ONCE AS NEEDED
Status: DISCONTINUED | OUTPATIENT
Start: 2025-05-07 | End: 2025-05-07 | Stop reason: HOSPADM

## 2025-05-07 RX ORDER — EPINEPHRINE 0.3 MG/.3ML
0.3 INJECTION SUBCUTANEOUS ONCE AS NEEDED
OUTPATIENT
Start: 2025-05-14

## 2025-05-07 RX ORDER — SODIUM FERRIC GLUCONATE COMPLEX IN SUCROSE 12.5 MG/ML
125 INJECTION INTRAVENOUS
Status: COMPLETED | OUTPATIENT
Start: 2025-05-07 | End: 2025-05-07

## 2025-05-07 RX ORDER — SODIUM FERRIC GLUCONATE COMPLEX IN SUCROSE 12.5 MG/ML
125 INJECTION INTRAVENOUS
OUTPATIENT
Start: 2025-05-14

## 2025-05-07 RX ADMIN — SODIUM CHLORIDE: 9 INJECTION, SOLUTION INTRAVENOUS at 12:05

## 2025-05-07 RX ADMIN — SODIUM FERRIC GLUCONATE COMPLEX IN SUCROSE 125 MG: 12.5 INJECTION INTRAVENOUS at 12:05

## 2025-05-07 NOTE — PROGRESS NOTES
Here for routine OB appt at 32w6d, with no complaints.  Reports good FM.  Denies LOF, denies VB, denies contractions.  Reviewed warning signs of Labor and Preeclampsia.  Daily FM counts reinforced.  Initial diastolic slightly elevated 128/90. Denies pre-e symptoms.  Having some LUQ pain- on v scan baby A feet is near ribs.  PNT twice weekly- sees mfm tomorrow  PP visit scheduled  CS on 5/23, informational sheet given. Discussed NPO after midnight, washing night before, etc.   Received tdap  IV iron infusion today  RTC in 2 weeks

## 2025-05-07 NOTE — PLAN OF CARE
Ferrlecit IV push complete. Pt tolerated well. VSS. NAD. IV to left AC DC'd.  Next infusion appointment confirmed. Pt verbalized understanding of discharge instructions before leaving.

## 2025-05-07 NOTE — PATIENT INSTRUCTIONS
[facility]    Tdap or Dtap for close family if not had in the past five years    [facility], Labor and Delivery is on the 6th floor of [facility]: [phone]    [address] PHONE NUMBER, [phone] (OPEN MON-FRI, 8a-5p)    [email or web address]    Blood pressures to look out for:  Top number >140 OR bottom number>90  If elevated, wait 10-15minutes and then repeat  If still elevated, reach out to Doctor.  If top number >160 OR bottom >110, repeat  If still that high, proceed straight to the [facility]

## 2025-05-08 ENCOUNTER — OFFICE VISIT (OUTPATIENT)
Dept: MATERNAL FETAL MEDICINE | Facility: CLINIC | Age: 20
End: 2025-05-08
Payer: MEDICAID

## 2025-05-08 ENCOUNTER — PROCEDURE VISIT (OUTPATIENT)
Dept: MATERNAL FETAL MEDICINE | Facility: CLINIC | Age: 20
End: 2025-05-08
Payer: MEDICAID

## 2025-05-08 VITALS
WEIGHT: 144.31 LBS | DIASTOLIC BLOOD PRESSURE: 75 MMHG | BODY MASS INDEX: 25.56 KG/M2 | SYSTOLIC BLOOD PRESSURE: 120 MMHG

## 2025-05-08 DIAGNOSIS — O36.5922 INTRAUTERINE GROWTH RESTRICTION, ANTEPARTUM, SECOND TRIMESTER, FETUS 2: ICD-10-CM

## 2025-05-08 DIAGNOSIS — O30.033 MONOCHORIONIC DIAMNIOTIC TWIN GESTATION IN THIRD TRIMESTER: ICD-10-CM

## 2025-05-08 DIAGNOSIS — O99.012 ANEMIA DURING PREGNANCY IN SECOND TRIMESTER: ICD-10-CM

## 2025-05-08 DIAGNOSIS — O30.032 MONOCHORIONIC DIAMNIOTIC TWIN GESTATION IN SECOND TRIMESTER: ICD-10-CM

## 2025-05-08 DIAGNOSIS — O99.013 MATERNAL IRON DEFICIENCY ANEMIA AFFECTING PREGNANCY IN THIRD TRIMESTER, ANTEPARTUM: Primary | ICD-10-CM

## 2025-05-08 DIAGNOSIS — D50.9 MATERNAL IRON DEFICIENCY ANEMIA AFFECTING PREGNANCY IN THIRD TRIMESTER, ANTEPARTUM: Primary | ICD-10-CM

## 2025-05-08 DIAGNOSIS — Z36.89 ENCOUNTER FOR ULTRASOUND TO ASSESS FETAL GROWTH: ICD-10-CM

## 2025-05-08 PROCEDURE — 76819 FETAL BIOPHYS PROFIL W/O NST: CPT | Mod: 26,59,S$PBB, | Performed by: STUDENT IN AN ORGANIZED HEALTH CARE EDUCATION/TRAINING PROGRAM

## 2025-05-08 PROCEDURE — 76816 OB US FOLLOW-UP PER FETUS: CPT | Mod: 59,PBBFAC | Performed by: STUDENT IN AN ORGANIZED HEALTH CARE EDUCATION/TRAINING PROGRAM

## 2025-05-08 PROCEDURE — 99999 PR PBB SHADOW E&M-EST. PATIENT-LVL II: CPT | Mod: PBBFAC,,, | Performed by: STUDENT IN AN ORGANIZED HEALTH CARE EDUCATION/TRAINING PROGRAM

## 2025-05-08 PROCEDURE — 76821 MIDDLE CEREBRAL ARTERY ECHO: CPT | Mod: 26,59,S$PBB, | Performed by: STUDENT IN AN ORGANIZED HEALTH CARE EDUCATION/TRAINING PROGRAM

## 2025-05-08 PROCEDURE — 1159F MED LIST DOCD IN RCRD: CPT | Mod: CPTII,,, | Performed by: STUDENT IN AN ORGANIZED HEALTH CARE EDUCATION/TRAINING PROGRAM

## 2025-05-08 PROCEDURE — 99214 OFFICE O/P EST MOD 30 MIN: CPT | Mod: S$PBB,TH,, | Performed by: STUDENT IN AN ORGANIZED HEALTH CARE EDUCATION/TRAINING PROGRAM

## 2025-05-08 PROCEDURE — 3078F DIAST BP <80 MM HG: CPT | Mod: CPTII,,, | Performed by: STUDENT IN AN ORGANIZED HEALTH CARE EDUCATION/TRAINING PROGRAM

## 2025-05-08 PROCEDURE — 99212 OFFICE O/P EST SF 10 MIN: CPT | Mod: PBBFAC,TH | Performed by: STUDENT IN AN ORGANIZED HEALTH CARE EDUCATION/TRAINING PROGRAM

## 2025-05-08 PROCEDURE — 3008F BODY MASS INDEX DOCD: CPT | Mod: CPTII,,, | Performed by: STUDENT IN AN ORGANIZED HEALTH CARE EDUCATION/TRAINING PROGRAM

## 2025-05-08 PROCEDURE — 76820 UMBILICAL ARTERY ECHO: CPT | Mod: 26,59,S$PBB, | Performed by: STUDENT IN AN ORGANIZED HEALTH CARE EDUCATION/TRAINING PROGRAM

## 2025-05-08 PROCEDURE — 3074F SYST BP LT 130 MM HG: CPT | Mod: CPTII,,, | Performed by: STUDENT IN AN ORGANIZED HEALTH CARE EDUCATION/TRAINING PROGRAM

## 2025-05-08 NOTE — ASSESSMENT & PLAN NOTE
Early onset selective FGR, initially type III, continues to demonstrate persistently normal UA Dopplers, consistent with type I.  - Declined referral for possible intervention at time or initial concern for worse sFGR    Stable sFGR for twin B with today's growth US, twin A AGA, 20.8% discordant  Normal UAD, normal MCAs for both today  No evidence of TTTS or TAPS    Recommendations:  Continue weekly Doppler assessments with MFM visits  Continue serial evaluations for TTTS and TAPS  PNT Twice weekly (1/week in PNT, 1/week up in MFM)  Delivery timing 35 weeks if remains stable. Scheduled for 5/23

## 2025-05-08 NOTE — PROGRESS NOTES
Maternal Fetal Medicine follow up consult    SUBJECTIVE:     Amena Theodore is a 19 y.o.  female with IUP at 33w0d who is seen in follow up consultation by MFM.  Pregnancy complications include:   Problem   Monochorionic Diamniotic Twin Gestation in Third Trimester   Early onset selective FGR, fetus 2       Previous notes reviewed.   No changes to medical, surgical, family, social, or obstetric history.    Interval history since last MFM visit: No complaints. Denies cramping or contractions, no vaginal bleeding, no LOF. + FM x2. S/p IV iron yesterday.     Medications reviewed.    Care team members:  Dr. Nava - Primary OB  MFM     OBJECTIVE:   /75 (BP Location: Left arm, Patient Position: Sitting)   Wt 65.5 kg (144 lb 4.7 oz)   LMP 2024   BMI 25.56 kg/m²     Physical Exam  Vitals and nursing note reviewed.   Constitutional:       General: She is not in acute distress.     Appearance: Normal appearance. She is not ill-appearing.   HENT:      Head: Atraumatic.   Eyes:      Conjunctiva/sclera: Conjunctivae normal.   Pulmonary:      Effort: Pulmonary effort is normal. No respiratory distress.   Neurological:      Mental Status: She is alert and oriented to person, place, and time.   Psychiatric:         Mood and Affect: Mood normal.         Behavior: Behavior normal.         Ultrasound performed. See viewpoint for full ultrasound report.  Preliminary:  Persistent FGR of Twin B with normal dopplers   No evidence of TTTS    Significant labs/imaging:  N/A    ASSESSMENT/PLAN:     19 y.o.  female with IUP at 33w0d    Monochorionic diamniotic twin gestation in third trimester  See prior counseling  Normal glucose screening  Fetal echocardiograms WNL x2    Recommendations:  Continue ASA 81 mg daily  Continue care with Dr. Nava    Early onset selective FGR, fetus 2  Early onset selective FGR, initially type III, continues to demonstrate persistently normal UA Dopplers, consistent with type I.  - Declined  referral for possible intervention at time or initial concern for worse sFGR    Stable sFGR for twin B with today's growth US, twin A AGA, 20.8% discordant  Normal UAD, normal MCAs for both today  No evidence of TTTS or TAPS    Recommendations:  Continue weekly Doppler assessments with MFM visits  Continue serial evaluations for TTTS and TAPS  PNT Twice weekly (1/week in PNT, 1/week up in MFM)  Delivery timing 35 weeks if remains stable. Scheduled for           Alanna Avliez MD, MPH  OBGYN PGY-4        ATTENDING ATTESTATION  I have seen the patient and reviewed the Dr. Goldsmith's consultation note, assessment and plan. I have personally spoken to and discussed plan with the patient and agree with the finding with the following notations.    Please see Viewpoint for full details of today's sonographic assessment.       FOLLOW UP:   Continue weekly  testing, scheduled  F/u in 1 weeks for MFM visit  F/u in 1 weeks for US (TTTS, UA of Twin B)  Delivery scheduled  via C/S     Patient was counseled that prenatal ultrasound studies have limitations. They do not detect all fetal, genetic, placental, and maternal abnormalities. A normal appearing prenatal ultrasound is reassuring. However, it does not guarantee the absence of an abnormality or predict a normal outcome for the fetus or the mother.     The patient was given an opportunity to ask questions about the management of her high risk pregnancy problems. She expressed an understanding of and agreement to the above impression and plan. All questions were answered to her satisfaction.    30 minutes of total time spent on the encounter, which includes face to face time and non-face to face time preparing to see the patient (eg, review of tests), obtaining and/or reviewing separately obtained history, documenting clinical information in the electronic or other health record, independently interpreting results (not separately reported) and communicating  results to the patient/family/caregiver, or care coordination (not separately reported).      Marika Toribio MD  Maternal Fetal Medicine      Electronically Signed by Marika Toribio May 8, 2025

## 2025-05-08 NOTE — ASSESSMENT & PLAN NOTE
See prior counseling  Normal glucose screening  Fetal echocardiograms WNL x2    Recommendations:  Continue ASA 81 mg daily  Continue care with Dr. Nava

## 2025-05-12 ENCOUNTER — HOSPITAL ENCOUNTER (OUTPATIENT)
Dept: PERINATAL CARE | Facility: OTHER | Age: 20
Discharge: HOME | End: 2025-05-12
Attending: STUDENT IN AN ORGANIZED HEALTH CARE EDUCATION/TRAINING PROGRAM
Payer: MEDICAID

## 2025-05-12 DIAGNOSIS — O36.5912: ICD-10-CM

## 2025-05-12 DIAGNOSIS — O30.039 MONOCHORIONIC DIAMNIOTIC TWIN PREGNANCY, ANTEPARTUM: ICD-10-CM

## 2025-05-12 PROCEDURE — 59025 FETAL NON-STRESS TEST: CPT | Mod: 26,59,, | Performed by: OBSTETRICS & GYNECOLOGY

## 2025-05-12 PROCEDURE — 59025 FETAL NON-STRESS TEST: CPT

## 2025-05-15 ENCOUNTER — TELEPHONE (OUTPATIENT)
Dept: MATERNAL FETAL MEDICINE | Facility: CLINIC | Age: 20
End: 2025-05-15
Payer: MEDICAID

## 2025-05-15 NOTE — ASSESSMENT & PLAN NOTE
Early onset selective FGR, initially type III, continues to demonstrate persistently normal UA Dopplers, consistent with type I. (Declined referral for possible intervention at time of dx)    As of most recent growth evaluation on 5/8, Stable sFGR for twin B, twin A AGA, 20.8% discordant  On today's US, normal UAD, normal MCAs for both, BPP 8/8 for both and no evidence of TTTS or TAPS    Recommendations:  Continue weekly Doppler assessments with MFM visits  Continue serial evaluations for TTTS and TAPS  PNT Twice weekly (1/week in PNT, 1/week up in MFM)  Delivery timing 35 weeks if remains stable. Scheduled for next Friday 5/23

## 2025-05-15 NOTE — TELEPHONE ENCOUNTER
Called patient to after being 40 minutes late for her 8am appt. I left a voicemail to call our office and let us know if she needs to reschedule aor come at a later time today.

## 2025-05-15 NOTE — ASSESSMENT & PLAN NOTE
See prior counseling  Normal glucose screening  Fetal echocardiograms WNL x2    Recommendations:  Continue ASA 81 mg daily  Continue care with Dr. Nava  OB ED triage precautions reviewed - encouraged otc treatment of headache this morning, as I have a low suspicion for preeclampsia, but reviewed strict precautions for returning to be evaluated if unresolved

## 2025-05-16 ENCOUNTER — INFUSION (OUTPATIENT)
Dept: INFUSION THERAPY | Facility: OTHER | Age: 20
End: 2025-05-16
Attending: STUDENT IN AN ORGANIZED HEALTH CARE EDUCATION/TRAINING PROGRAM
Payer: MEDICAID

## 2025-05-16 ENCOUNTER — PROCEDURE VISIT (OUTPATIENT)
Dept: MATERNAL FETAL MEDICINE | Facility: CLINIC | Age: 20
End: 2025-05-16
Payer: MEDICAID

## 2025-05-16 ENCOUNTER — OFFICE VISIT (OUTPATIENT)
Dept: MATERNAL FETAL MEDICINE | Facility: CLINIC | Age: 20
End: 2025-05-16
Payer: MEDICAID

## 2025-05-16 VITALS
BODY MASS INDEX: 26.18 KG/M2 | DIASTOLIC BLOOD PRESSURE: 86 MMHG | SYSTOLIC BLOOD PRESSURE: 122 MMHG | WEIGHT: 147.81 LBS

## 2025-05-16 VITALS
SYSTOLIC BLOOD PRESSURE: 124 MMHG | HEART RATE: 75 BPM | RESPIRATION RATE: 17 BRPM | OXYGEN SATURATION: 99 % | DIASTOLIC BLOOD PRESSURE: 85 MMHG | TEMPERATURE: 99 F

## 2025-05-16 DIAGNOSIS — Z36.89 ENCOUNTER FOR ULTRASOUND TO ASSESS FETAL GROWTH: ICD-10-CM

## 2025-05-16 DIAGNOSIS — O36.5922 INTRAUTERINE GROWTH RESTRICTION, ANTEPARTUM, SECOND TRIMESTER, FETUS 2: ICD-10-CM

## 2025-05-16 DIAGNOSIS — O99.012 ANEMIA DURING PREGNANCY IN SECOND TRIMESTER: ICD-10-CM

## 2025-05-16 DIAGNOSIS — O30.032 MONOCHORIONIC DIAMNIOTIC TWIN GESTATION IN SECOND TRIMESTER: ICD-10-CM

## 2025-05-16 DIAGNOSIS — O99.013 MATERNAL IRON DEFICIENCY ANEMIA AFFECTING PREGNANCY IN THIRD TRIMESTER, ANTEPARTUM: Primary | ICD-10-CM

## 2025-05-16 DIAGNOSIS — D50.9 MATERNAL IRON DEFICIENCY ANEMIA AFFECTING PREGNANCY IN THIRD TRIMESTER, ANTEPARTUM: Primary | ICD-10-CM

## 2025-05-16 DIAGNOSIS — O30.033 MONOCHORIONIC DIAMNIOTIC TWIN GESTATION IN THIRD TRIMESTER: Primary | ICD-10-CM

## 2025-05-16 PROCEDURE — 63600175 PHARM REV CODE 636 W HCPCS: Performed by: STUDENT IN AN ORGANIZED HEALTH CARE EDUCATION/TRAINING PROGRAM

## 2025-05-16 PROCEDURE — 99999 PR PBB SHADOW E&M-EST. PATIENT-LVL III: CPT | Mod: PBBFAC,,, | Performed by: STUDENT IN AN ORGANIZED HEALTH CARE EDUCATION/TRAINING PROGRAM

## 2025-05-16 PROCEDURE — 96374 THER/PROPH/DIAG INJ IV PUSH: CPT

## 2025-05-16 PROCEDURE — 76821 MIDDLE CEREBRAL ARTERY ECHO: CPT | Mod: 26,59,S$PBB, | Performed by: STUDENT IN AN ORGANIZED HEALTH CARE EDUCATION/TRAINING PROGRAM

## 2025-05-16 PROCEDURE — 25000003 PHARM REV CODE 250: Performed by: STUDENT IN AN ORGANIZED HEALTH CARE EDUCATION/TRAINING PROGRAM

## 2025-05-16 PROCEDURE — 76819 FETAL BIOPHYS PROFIL W/O NST: CPT | Mod: 26,59,S$PBB, | Performed by: STUDENT IN AN ORGANIZED HEALTH CARE EDUCATION/TRAINING PROGRAM

## 2025-05-16 PROCEDURE — 99213 OFFICE O/P EST LOW 20 MIN: CPT | Mod: PBBFAC,TH | Performed by: STUDENT IN AN ORGANIZED HEALTH CARE EDUCATION/TRAINING PROGRAM

## 2025-05-16 PROCEDURE — 76820 UMBILICAL ARTERY ECHO: CPT | Mod: 59,PBBFAC | Performed by: STUDENT IN AN ORGANIZED HEALTH CARE EDUCATION/TRAINING PROGRAM

## 2025-05-16 RX ORDER — HEPARIN 100 UNIT/ML
500 SYRINGE INTRAVENOUS
Status: DISCONTINUED | OUTPATIENT
Start: 2025-05-16 | End: 2025-05-16 | Stop reason: HOSPADM

## 2025-05-16 RX ORDER — SODIUM FERRIC GLUCONATE COMPLEX IN SUCROSE 12.5 MG/ML
125 INJECTION INTRAVENOUS
Status: COMPLETED | OUTPATIENT
Start: 2025-05-16 | End: 2025-05-16

## 2025-05-16 RX ORDER — HEPARIN 100 UNIT/ML
500 SYRINGE INTRAVENOUS
OUTPATIENT
Start: 2025-05-21

## 2025-05-16 RX ORDER — EPINEPHRINE 0.3 MG/.3ML
0.3 INJECTION SUBCUTANEOUS ONCE AS NEEDED
Status: DISCONTINUED | OUTPATIENT
Start: 2025-05-16 | End: 2025-05-16 | Stop reason: HOSPADM

## 2025-05-16 RX ORDER — SODIUM FERRIC GLUCONATE COMPLEX IN SUCROSE 12.5 MG/ML
125 INJECTION INTRAVENOUS
OUTPATIENT
Start: 2025-05-21

## 2025-05-16 RX ORDER — EPINEPHRINE 0.3 MG/.3ML
0.3 INJECTION SUBCUTANEOUS ONCE AS NEEDED
OUTPATIENT
Start: 2025-05-21

## 2025-05-16 RX ORDER — SODIUM CHLORIDE 0.9 % (FLUSH) 0.9 %
10 SYRINGE (ML) INJECTION
Status: DISCONTINUED | OUTPATIENT
Start: 2025-05-16 | End: 2025-05-16 | Stop reason: HOSPADM

## 2025-05-16 RX ORDER — SODIUM CHLORIDE 0.9 % (FLUSH) 0.9 %
10 SYRINGE (ML) INJECTION
OUTPATIENT
Start: 2025-05-21

## 2025-05-16 RX ADMIN — SODIUM FERRIC GLUCONATE COMPLEX IN SUCROSE 125 MG: 12.5 INJECTION INTRAVENOUS at 10:05

## 2025-05-16 RX ADMIN — SODIUM CHLORIDE: 9 INJECTION, SOLUTION INTRAVENOUS at 09:05

## 2025-05-16 NOTE — PROGRESS NOTES
Maternal Fetal Medicine follow up consult    SUBJECTIVE:     Amena Theodore is a 19 y.o.  female with IUP at 34w1d who is seen in follow up consultation by MFM.  Pregnancy complications include:   Problem   Monochorionic Diamniotic Twin Gestation in Third Trimester   Early onset selective FGR, fetus 2       Previous notes reviewed.   No changes to medical, surgical, family, social, or obstetric history.    Interval history since last M visit: Has been doing well. Denies decreased fetal movement, vaginal bleeding/spotting, loss of fluid or pain/contractions increasing in intensity and frequency.     Has had a headache this morning, but has not tried anything to relieve it because she feels that she already takes enough pills between the PNV and the iron supplement and baby ASA. Denies blurred vision, CP/SOB, RUQ pain, sudden swelling. Amenable to trying some tylenol and hydration this morning to relieve.     Medications reviewed.    Care team members:  Primary OB   Jo Ann Casey, NP  [address] [address]       OBJECTIVE:   /86 (BP Location: Right arm, Patient Position: Sitting)   Wt 67 kg (147 lb 13.1 oz)   LMP 2024   BMI 26.18 kg/m²      Physical Exam   WNWD female appearing stated age, in NAD   No conversational dyspnea  Gravid abdomen    Ultrasound performed. See viewpoint for full ultrasound report.  1. Monochorionic-diamniotic twin pregnancy   2. Twin A  - Normal repeat limited fetal anatomic survey.   - Bladder is visualized.  - BPP is normal at 8/8 and MVP is normal amount.  - Umbilical artery Doppler SD ratio is 50% with persistent forward end diastolic velocity  - MCA Doppler peak systolic velocity is normal at 1.19 MoM  - Presentation is cephalic, RLQ , presenting.  3. Twin B  - Normal repeat limited fetal anatomic survey.   - Bladder is visualized.  - BPP is normal at 8/8 and MVP is normal amount.  - Umbilical artery Doppler SD ratio is 76% with  persistent forward end diastolic velocity  - MCA Doppler peak systolic velocity is normal at 1.09 MoM  - Presentation is cephalic, RLQ , non-presenting.  4. There are no signs of TTTS.     ASSESSMENT/PLAN:     19 y.o.  female with IUP at 34w1d    The following were addressed today.     Monochorionic diamniotic twin gestation in third trimester  See prior counseling  Normal glucose screening  Fetal echocardiograms WNL x2    Recommendations:  Continue ASA 81 mg daily  Continue care with Dr. Nava  OB ED triage precautions reviewed - encouraged otc treatment of headache this morning, as I have a low suspicion for preeclampsia, but reviewed strict precautions for returning to be evaluated if unresolved    Early onset selective FGR, fetus 2  Early onset selective FGR, initially type III, continues to demonstrate persistently normal UA Dopplers, consistent with type I. (Declined referral for possible intervention at time of dx)    As of most recent growth evaluation on , Stable sFGR for twin B, twin A AGA, 20.8% discordant  On today's US, normal UAD, normal MCAs for both, BPP  for both and no evidence of TTTS or TAPS    Recommendations:  Continue weekly Doppler assessments with MFM visits  Continue serial evaluations for TTTS and TAPS  PNT Twice weekly (1/week in PNT, 1/week up in MFM)  Delivery timing 35 weeks if remains stable. Scheduled for next       Please see original MFM consultation for full details regarding management recommendations of these and other obstetric co-morbidities.    FOLLOW UP  F/u in 1 weeks for US  F/u in 1 weeks for MFM visit      20 minutes of total time spent on the encounter, which includes face to face time and non-face to face time preparing to see the patient (eg, review of tests), obtaining and/or reviewing separately obtained history, documenting clinical information in the electronic or other health record, independently interpreting results (not separately  reported) and communicating results to the patient/family/caregiver, or care coordination (not separately reported).    Marika Toribio MD  Maternal Fetal Medicine

## 2025-05-16 NOTE — PLAN OF CARE
Ferrlecit IV push administered, no reaction. Patient tolerated well. Patient accompanied by friend for discharge home. 24 gauge IV removed, catheter intact. No apparent distress noted. Discharge instructions given to patient. Patient understands instructions. Follow up appointment scheduled.

## 2025-05-16 NOTE — LETTER
May 16, 2025      [facility] - Maternal Fetal Medicine  [address] [facility]  [place] [address]  Phone: [phone]       Patient: Amena Theodore   YOB: 2005  Date of Visit: 05/16/2025    To Whom It May Concern:    Alexander Theodore  was at [facility] on 05/16/2025. The patient may return to work/school on 05/16/2025 with no restrictions. If you have any questions or concerns, or if I can be of further assistance, please do not hesitate to contact me.    Sincerely,      Enedelia Koenig RN

## 2025-05-19 ENCOUNTER — HOSPITAL ENCOUNTER (OUTPATIENT)
Dept: PERINATAL CARE | Facility: OTHER | Age: 20
Discharge: HOME | End: 2025-05-19
Attending: STUDENT IN AN ORGANIZED HEALTH CARE EDUCATION/TRAINING PROGRAM
Payer: MEDICAID

## 2025-05-19 DIAGNOSIS — O30.039 MONOCHORIONIC DIAMNIOTIC TWIN PREGNANCY, ANTEPARTUM: ICD-10-CM

## 2025-05-19 DIAGNOSIS — O36.5912: ICD-10-CM

## 2025-05-19 PROCEDURE — 59025 FETAL NON-STRESS TEST: CPT

## 2025-05-19 PROCEDURE — 59025 FETAL NON-STRESS TEST: CPT | Mod: 26,59,, | Performed by: OBSTETRICS & GYNECOLOGY

## 2025-05-21 ENCOUNTER — OFFICE VISIT (OUTPATIENT)
Dept: OBSTETRICS AND GYNECOLOGY | Facility: CLINIC | Age: 20
End: 2025-05-21
Payer: MEDICAID

## 2025-05-21 DIAGNOSIS — O99.019 ANEMIA AFFECTING FIRST PREGNANCY: Primary | ICD-10-CM

## 2025-05-21 NOTE — PROGRESS NOTES
The patient location is:  Patient Home   The chief complaint leading to consultation is: mono/di twins, delivery planning  Visit type: Virtual visit with synchronous audio and video  Total time spent with patient: 15 min  Each patient to whom he or she provides medical services by telemedicine is:  (1) informed of the relationship between the physician and patient and the respective role of any other health care provider with respect to management of the patient; and (2) notified that he or she may decline to receive medical services by telemedicine and may withdraw from such care at any time.    Pt to have repeat scan tomorrow as well as iron infusion. CS scheduled for Friday  Amena doing well overall, but is very nervous. Reviewed pre op instructions and expectations for day of surgery. Reviewed the possibility of  NICU stay  Pt has soap to clean with before surgery   All questions answered  Will plan for mood check postpartum as well as routine postpartum

## 2025-05-22 ENCOUNTER — INFUSION (OUTPATIENT)
Dept: INFUSION THERAPY | Facility: OTHER | Age: 20
End: 2025-05-22
Attending: STUDENT IN AN ORGANIZED HEALTH CARE EDUCATION/TRAINING PROGRAM
Payer: MEDICAID

## 2025-05-22 ENCOUNTER — OFFICE VISIT (OUTPATIENT)
Dept: MATERNAL FETAL MEDICINE | Facility: CLINIC | Age: 20
End: 2025-05-22
Payer: MEDICAID

## 2025-05-22 ENCOUNTER — PATIENT MESSAGE (OUTPATIENT)
Dept: OTHER | Facility: OTHER | Age: 20
End: 2025-05-22
Payer: MEDICAID

## 2025-05-22 ENCOUNTER — PROCEDURE VISIT (OUTPATIENT)
Dept: MATERNAL FETAL MEDICINE | Facility: CLINIC | Age: 20
End: 2025-05-22
Payer: MEDICAID

## 2025-05-22 ENCOUNTER — ANESTHESIA EVENT (OUTPATIENT)
Dept: OBSTETRICS AND GYNECOLOGY | Facility: OTHER | Age: 20
End: 2025-05-22
Payer: MEDICAID

## 2025-05-22 ENCOUNTER — CLINICAL SUPPORT (OUTPATIENT)
Dept: OBSTETRICS AND GYNECOLOGY | Facility: CLINIC | Age: 20
End: 2025-05-22
Payer: MEDICAID

## 2025-05-22 ENCOUNTER — PATIENT MESSAGE (OUTPATIENT)
Dept: OBSTETRICS AND GYNECOLOGY | Facility: CLINIC | Age: 20
End: 2025-05-22
Payer: MEDICAID

## 2025-05-22 VITALS
DIASTOLIC BLOOD PRESSURE: 74 MMHG | SYSTOLIC BLOOD PRESSURE: 127 MMHG | OXYGEN SATURATION: 100 % | HEART RATE: 79 BPM | RESPIRATION RATE: 16 BRPM

## 2025-05-22 VITALS
DIASTOLIC BLOOD PRESSURE: 84 MMHG | WEIGHT: 153.88 LBS | WEIGHT: 153.88 LBS | DIASTOLIC BLOOD PRESSURE: 84 MMHG | SYSTOLIC BLOOD PRESSURE: 134 MMHG | BODY MASS INDEX: 27.26 KG/M2 | BODY MASS INDEX: 27.26 KG/M2 | SYSTOLIC BLOOD PRESSURE: 134 MMHG

## 2025-05-22 DIAGNOSIS — O99.013 MATERNAL IRON DEFICIENCY ANEMIA AFFECTING PREGNANCY IN THIRD TRIMESTER, ANTEPARTUM: Primary | ICD-10-CM

## 2025-05-22 DIAGNOSIS — O30.039 MONOCHORIONIC DIAMNIOTIC TWIN PREGNANCY, ANTEPARTUM: ICD-10-CM

## 2025-05-22 DIAGNOSIS — O30.032 MONOCHORIONIC DIAMNIOTIC TWIN GESTATION IN SECOND TRIMESTER: ICD-10-CM

## 2025-05-22 DIAGNOSIS — D50.9 MATERNAL IRON DEFICIENCY ANEMIA AFFECTING PREGNANCY IN THIRD TRIMESTER, ANTEPARTUM: Primary | ICD-10-CM

## 2025-05-22 DIAGNOSIS — O36.5922 INTRAUTERINE GROWTH RESTRICTION, ANTEPARTUM, SECOND TRIMESTER, FETUS 2: Primary | ICD-10-CM

## 2025-05-22 DIAGNOSIS — O36.5912: Primary | ICD-10-CM

## 2025-05-22 DIAGNOSIS — O36.5922 INTRAUTERINE GROWTH RESTRICTION, ANTEPARTUM, SECOND TRIMESTER, FETUS 2: ICD-10-CM

## 2025-05-22 DIAGNOSIS — Z36.89 ENCOUNTER FOR ULTRASOUND TO ASSESS FETAL GROWTH: ICD-10-CM

## 2025-05-22 DIAGNOSIS — O30.033 MONOCHORIONIC DIAMNIOTIC TWIN GESTATION IN THIRD TRIMESTER: ICD-10-CM

## 2025-05-22 PROCEDURE — 96372 THER/PROPH/DIAG INJ SC/IM: CPT | Mod: PBBFAC

## 2025-05-22 PROCEDURE — 76821 MIDDLE CEREBRAL ARTERY ECHO: CPT | Mod: 26,59,S$PBB, | Performed by: OBSTETRICS & GYNECOLOGY

## 2025-05-22 PROCEDURE — 99999 PR PBB SHADOW E&M-EST. PATIENT-LVL II: CPT | Mod: PBBFAC,,, | Performed by: OBSTETRICS & GYNECOLOGY

## 2025-05-22 PROCEDURE — 1159F MED LIST DOCD IN RCRD: CPT | Mod: CPTII,,, | Performed by: OBSTETRICS & GYNECOLOGY

## 2025-05-22 PROCEDURE — 3079F DIAST BP 80-89 MM HG: CPT | Mod: CPTII,,, | Performed by: OBSTETRICS & GYNECOLOGY

## 2025-05-22 PROCEDURE — 99214 OFFICE O/P EST MOD 30 MIN: CPT | Mod: S$PBB,TH,, | Performed by: OBSTETRICS & GYNECOLOGY

## 2025-05-22 PROCEDURE — 76820 UMBILICAL ARTERY ECHO: CPT | Mod: 59,PBBFAC | Performed by: OBSTETRICS & GYNECOLOGY

## 2025-05-22 PROCEDURE — 96374 THER/PROPH/DIAG INJ IV PUSH: CPT

## 2025-05-22 PROCEDURE — 25000003 PHARM REV CODE 250: Performed by: STUDENT IN AN ORGANIZED HEALTH CARE EDUCATION/TRAINING PROGRAM

## 2025-05-22 PROCEDURE — 99212 OFFICE O/P EST SF 10 MIN: CPT | Mod: PBBFAC,25,TH | Performed by: OBSTETRICS & GYNECOLOGY

## 2025-05-22 PROCEDURE — 63600175 PHARM REV CODE 636 W HCPCS: Performed by: STUDENT IN AN ORGANIZED HEALTH CARE EDUCATION/TRAINING PROGRAM

## 2025-05-22 PROCEDURE — 99999PBSHW PR PBB SHADOW TECHNICAL ONLY FILED TO HB: Mod: PBBFAC,,,

## 2025-05-22 PROCEDURE — 3008F BODY MASS INDEX DOCD: CPT | Mod: CPTII,,, | Performed by: OBSTETRICS & GYNECOLOGY

## 2025-05-22 PROCEDURE — 99999 PR PBB SHADOW E&M-EST. PATIENT-LVL I: CPT | Mod: PBBFAC,,,

## 2025-05-22 PROCEDURE — 3075F SYST BP GE 130 - 139MM HG: CPT | Mod: CPTII,,, | Performed by: OBSTETRICS & GYNECOLOGY

## 2025-05-22 PROCEDURE — 76819 FETAL BIOPHYS PROFIL W/O NST: CPT | Mod: 26,59,S$PBB, | Performed by: OBSTETRICS & GYNECOLOGY

## 2025-05-22 RX ORDER — EPINEPHRINE 0.3 MG/.3ML
0.3 INJECTION SUBCUTANEOUS ONCE AS NEEDED
Status: DISCONTINUED | OUTPATIENT
Start: 2025-05-22 | End: 2025-05-22 | Stop reason: HOSPADM

## 2025-05-22 RX ORDER — EPINEPHRINE 0.3 MG/.3ML
0.3 INJECTION SUBCUTANEOUS ONCE AS NEEDED
Status: CANCELLED | OUTPATIENT
Start: 2025-05-28

## 2025-05-22 RX ORDER — SODIUM CHLORIDE 0.9 % (FLUSH) 0.9 %
10 SYRINGE (ML) INJECTION
Status: CANCELLED | OUTPATIENT
Start: 2025-05-28

## 2025-05-22 RX ORDER — BETAMETHASONE SODIUM PHOSPHATE AND BETAMETHASONE ACETATE 3; 3 MG/ML; MG/ML
12 INJECTION, SUSPENSION INTRA-ARTICULAR; INTRALESIONAL; INTRAMUSCULAR; SOFT TISSUE ONCE
Status: DISCONTINUED | OUTPATIENT
Start: 2025-05-22 | End: 2025-05-22

## 2025-05-22 RX ORDER — HEPARIN 100 UNIT/ML
500 SYRINGE INTRAVENOUS
Status: CANCELLED | OUTPATIENT
Start: 2025-05-28

## 2025-05-22 RX ORDER — SODIUM FERRIC GLUCONATE COMPLEX IN SUCROSE 12.5 MG/ML
125 INJECTION INTRAVENOUS
Status: CANCELLED | OUTPATIENT
Start: 2025-05-28

## 2025-05-22 RX ORDER — HEPARIN 100 UNIT/ML
500 SYRINGE INTRAVENOUS
Status: DISCONTINUED | OUTPATIENT
Start: 2025-05-22 | End: 2025-05-22 | Stop reason: HOSPADM

## 2025-05-22 RX ORDER — SODIUM CHLORIDE 0.9 % (FLUSH) 0.9 %
10 SYRINGE (ML) INJECTION
Status: DISCONTINUED | OUTPATIENT
Start: 2025-05-22 | End: 2025-05-22 | Stop reason: HOSPADM

## 2025-05-22 RX ORDER — SODIUM FERRIC GLUCONATE COMPLEX IN SUCROSE 12.5 MG/ML
125 INJECTION INTRAVENOUS
Status: COMPLETED | OUTPATIENT
Start: 2025-05-22 | End: 2025-05-22

## 2025-05-22 RX ADMIN — SODIUM CHLORIDE: 9 INJECTION, SOLUTION INTRAVENOUS at 01:05

## 2025-05-22 RX ADMIN — SODIUM FERRIC GLUCONATE COMPLEX IN SUCROSE 125 MG: 12.5 INJECTION INTRAVENOUS at 01:05

## 2025-05-22 RX ADMIN — BETAMETHASONE ACETATE AND BETAMETHASONE SODIUM PHOSPHATE 12 MG: 3; 3 INJECTION, SUSPENSION INTRA-ARTICULAR; INTRALESIONAL; INTRAMUSCULAR; SOFT TISSUE at 03:05

## 2025-05-22 NOTE — PROGRESS NOTES
Patient here for celestone 12mg  injection. No pain noted, injection given. Patient tolerated well advised to wait in lobby 5 minutes and report any adverse reactions.       Site: lb

## 2025-05-22 NOTE — ASSESSMENT & PLAN NOTE
See prior counseling  Normal glucose screening  Fetal echocardiograms WNL x2    Reviewed recommendation for betamethasone administration due to anticipated late  delivery. Send to infusion center for dose today, delivery scheduled tomorrow.     Recommendations:  Continue ASA 81 mg daily  Continue care with Dr. Nava  Scheduled for delivery tomorrow

## 2025-05-22 NOTE — PLAN OF CARE
Ferrlecit IV push administered, no reaction. Patient tolerated well. Patient accompanied by friend for discharge home. 24 gauge IV removed, catheter intact. Discharge instructions given to patient. Patient understands instructions. Follow up appointment scheduled.

## 2025-05-22 NOTE — PROGRESS NOTES
Maternal Fetal Medicine follow up consult    SUBJECTIVE:     Amena Theodore is a 19 y.o.  female with IUP at 35w0d who is seen in follow up consultation by MFM.  Pregnancy complications include:   Problem   Monochorionic Diamniotic Twin Gestation in Third Trimester   Early onset selective FGR, fetus 2     Previous notes reviewed.   No changes to medical, surgical, family, social, or obstetric history.    Interval history since last MFM visit:   Doing well today without complaints. Reports fatigue.     Medications reviewed.    Care team members:  Dr. Nava - Primary OB     OBJECTIVE:   /84 (BP Location: Right arm, Patient Position: Sitting)   Wt 69.8 kg (153 lb 14.1 oz)   LMP 2024   BMI 27.26 kg/m²       Ultrasound performed. See viewpoint for full ultrasound report.  1. Monochorionic-diamniotic twin pregnancy   2. Twin A   - Normal cardiac activity observed  - Normal limited fetal anatomic survey  - Bladder is visualized  - MVP is normal amount  - Umbilical artery Doppler SD ratio is normal with persistent forward flow  - MCA Doppler peak systolic velocity is normal at 1.32 MoM  3. Twin B   - Normal cardiac activity observed  - Normal limited fetal anatomic survey  - Bladder is visualized  - MVP is normal amount   - Umbilical artery Doppler SD ratio is normal with persistent forward flow  - MCA Doppler peak systolic velocity is normal at 0.89 MoM  4. There are no signs of TTTS or TAPS    Significant labs/imagin/7:   8.7>11/34.3<221  AST/ALT 16/8  Cr 0.6  P/Cr 0.17    Lab Results   Component Value Date    WBC 8.70 2025    HGB 11.0 (L) 2025    HCT 34.3 (L) 2025    MCV 92 2025     2025       ASSESSMENT/PLAN:     19 y.o.  female with IUP at 35w0d    Early onset selective FGR, fetus 2  Early onset selective FGR, initially type III, continues to demonstrate persistently normal UA Dopplers, consistent with type I. (Declined referral for possible intervention  at time of dx)    As of most recent growth evaluation on , Stable sFGR for twin B, twin A AGA, 20.8% discordant. No findings of TTTS/TAPS today.    Recommendations:  Delivery tomorrow () with Dr. Nava  Sent to infusion center for dose of BMZ due to planned late  delivery      Monochorionic diamniotic twin gestation in third trimester  See prior counseling  Normal glucose screening  Fetal echocardiograms WNL x2    Reviewed recommendation for betamethasone administration due to anticipated late  delivery. Send to infusion center for dose today, delivery scheduled tomorrow.     Recommendations:  Continue ASA 81 mg daily  Continue care with Dr. Nava  Scheduled for delivery tomorrow    Juani Hall MD  PGY 7  Maternal Fetal Medicine  [facility]

## 2025-05-22 NOTE — ASSESSMENT & PLAN NOTE
Early onset selective FGR, initially type III, continues to demonstrate persistently normal UA Dopplers, consistent with type I. (Declined referral for possible intervention at time of dx)    As of most recent growth evaluation on , Stable sFGR for twin B, twin A AGA, 20.8% discordant. No findings of TTTS/TAPS today.    Recommendations:  Delivery tomorrow () with Dr. Nava  Sent to infusion center for dose of BMZ due to planned late  delivery

## 2025-05-23 ENCOUNTER — HOSPITAL ENCOUNTER (INPATIENT)
Facility: OTHER | Age: 20
LOS: 4 days | Discharge: HOME | End: 2025-05-27
Attending: STUDENT IN AN ORGANIZED HEALTH CARE EDUCATION/TRAINING PROGRAM | Admitting: STUDENT IN AN ORGANIZED HEALTH CARE EDUCATION/TRAINING PROGRAM
Payer: MEDICAID

## 2025-05-23 ENCOUNTER — ANESTHESIA (OUTPATIENT)
Dept: OBSTETRICS AND GYNECOLOGY | Facility: OTHER | Age: 20
End: 2025-05-23
Payer: MEDICAID

## 2025-05-23 DIAGNOSIS — O13.9 GESTATIONAL HYPERTENSION, ANTEPARTUM: ICD-10-CM

## 2025-05-23 DIAGNOSIS — D62 ABLA (ACUTE BLOOD LOSS ANEMIA): ICD-10-CM

## 2025-05-23 DIAGNOSIS — Z98.891 STATUS POST CESAREAN DELIVERY: Primary | ICD-10-CM

## 2025-05-23 PROBLEM — D64.9 ANEMIA: Status: ACTIVE | Noted: 2025-05-23

## 2025-05-23 LAB
ABO + RH BLD: NORMAL
ABSOLUTE EOSINOPHIL (OHS): 0 K/UL
ABSOLUTE MONOCYTE (OHS): 0.55 K/UL (ref 0.3–1)
ABSOLUTE NEUTROPHIL COUNT (OHS): 8.45 K/UL (ref 1.8–7.7)
ALBUMIN SERPL BCP-MCNC: 2.1 G/DL (ref 3.5–5.2)
ALP SERPL-CCNC: 120 UNIT/L (ref 40–150)
ALT SERPL W/O P-5'-P-CCNC: 7 UNIT/L (ref 10–44)
ANION GAP (OHS): 7 MMOL/L (ref 8–16)
AST SERPL-CCNC: 18 UNIT/L (ref 11–45)
BASOPHILS # BLD AUTO: 0.01 K/UL
BASOPHILS NFR BLD AUTO: 0.1 %
BILIRUB SERPL-MCNC: 0.3 MG/DL (ref 0.1–1)
BLD GP AB SCN CELLS X3 SERPL QL: NORMAL
BUN SERPL-MCNC: 6 MG/DL (ref 6–20)
CALCIUM SERPL-MCNC: 7.6 MG/DL (ref 8.7–10.5)
CHLORIDE SERPL-SCNC: 110 MMOL/L (ref 95–110)
CO2 SERPL-SCNC: 22 MMOL/L (ref 23–29)
CREAT SERPL-MCNC: 0.5 MG/DL (ref 0.5–1.4)
ERYTHROCYTE [DISTWIDTH] IN BLOOD BY AUTOMATED COUNT: 13.5 % (ref 11.5–14.5)
GFR SERPLBLD CREATININE-BSD FMLA CKD-EPI: >60 ML/MIN/1.73/M2
GLUCOSE SERPL-MCNC: 93 MG/DL (ref 70–110)
HCT VFR BLD AUTO: 31.3 % (ref 37–48.5)
HGB BLD-MCNC: 10.6 GM/DL (ref 12–16)
IMM GRANULOCYTES # BLD AUTO: 0.1 K/UL (ref 0–0.04)
IMM GRANULOCYTES NFR BLD AUTO: 1 % (ref 0–0.5)
LYMPHOCYTES # BLD AUTO: 1.14 K/UL (ref 1–4.8)
MCH RBC QN AUTO: 30.6 PG (ref 27–31)
MCHC RBC AUTO-ENTMCNC: 33.9 G/DL (ref 32–36)
MCV RBC AUTO: 91 FL (ref 82–98)
NUCLEATED RBC (/100WBC) (OHS): 0 /100 WBC
PLATELET # BLD AUTO: 227 K/UL (ref 150–450)
PMV BLD AUTO: 10.8 FL (ref 9.2–12.9)
POTASSIUM SERPL-SCNC: 3.7 MMOL/L (ref 3.5–5.1)
PROT SERPL-MCNC: 5.2 GM/DL (ref 6–8.4)
RBC # BLD AUTO: 3.46 M/UL (ref 4–5.4)
RELATIVE EOSINOPHIL (OHS): 0 %
RELATIVE LYMPHOCYTE (OHS): 11.1 % (ref 18–48)
RELATIVE MONOCYTE (OHS): 5.4 % (ref 4–15)
RELATIVE NEUTROPHIL (OHS): 82.4 % (ref 38–73)
SODIUM SERPL-SCNC: 139 MMOL/L (ref 136–145)
SPECIMEN OUTDATE: NORMAL
T PALLIDUM IGG+IGM SER QL: NEGATIVE
WBC # BLD AUTO: 10.25 K/UL (ref 3.9–12.7)

## 2025-05-23 PROCEDURE — 37000009 HC ANESTHESIA EA ADD 15 MINS: Performed by: STUDENT IN AN ORGANIZED HEALTH CARE EDUCATION/TRAINING PROGRAM

## 2025-05-23 PROCEDURE — 86900 BLOOD TYPING SEROLOGIC ABO: CPT | Mod: 91

## 2025-05-23 PROCEDURE — 51702 INSERT TEMP BLADDER CATH: CPT

## 2025-05-23 PROCEDURE — 37000008 HC ANESTHESIA 1ST 15 MINUTES: Performed by: STUDENT IN AN ORGANIZED HEALTH CARE EDUCATION/TRAINING PROGRAM

## 2025-05-23 PROCEDURE — 25000003 PHARM REV CODE 250

## 2025-05-23 PROCEDURE — 71000033 HC RECOVERY, INTIAL HOUR: Performed by: STUDENT IN AN ORGANIZED HEALTH CARE EDUCATION/TRAINING PROGRAM

## 2025-05-23 PROCEDURE — 63600175 PHARM REV CODE 636 W HCPCS

## 2025-05-23 PROCEDURE — 63600175 PHARM REV CODE 636 W HCPCS: Mod: JZ,TB

## 2025-05-23 PROCEDURE — D9220A PRA ANESTHESIA: Mod: ,,, | Performed by: ANESTHESIOLOGY

## 2025-05-23 PROCEDURE — 85025 COMPLETE CBC W/AUTO DIFF WBC: CPT

## 2025-05-23 PROCEDURE — 36004725 HC OB OR TIME LEV III - EA ADD 15 MIN: Performed by: STUDENT IN AN ORGANIZED HEALTH CARE EDUCATION/TRAINING PROGRAM

## 2025-05-23 PROCEDURE — 86850 RBC ANTIBODY SCREEN: CPT

## 2025-05-23 PROCEDURE — 80053 COMPREHEN METABOLIC PANEL: CPT

## 2025-05-23 PROCEDURE — 86901 BLOOD TYPING SEROLOGIC RH(D): CPT | Mod: 91

## 2025-05-23 PROCEDURE — 71000039 HC RECOVERY, EACH ADD'L HOUR: Performed by: STUDENT IN AN ORGANIZED HEALTH CARE EDUCATION/TRAINING PROGRAM

## 2025-05-23 PROCEDURE — 36004724 HC OB OR TIME LEV III - 1ST 15 MIN: Performed by: STUDENT IN AN ORGANIZED HEALTH CARE EDUCATION/TRAINING PROGRAM

## 2025-05-23 PROCEDURE — 86593 SYPHILIS TEST NON-TREP QUANT: CPT

## 2025-05-23 PROCEDURE — 36415 COLL VENOUS BLD VENIPUNCTURE: CPT

## 2025-05-23 PROCEDURE — 88307 TISSUE EXAM BY PATHOLOGIST: CPT | Mod: TC | Performed by: STUDENT IN AN ORGANIZED HEALTH CARE EDUCATION/TRAINING PROGRAM

## 2025-05-23 PROCEDURE — 11000001 HC ACUTE MED/SURG PRIVATE ROOM

## 2025-05-23 RX ORDER — HYDROCORTISONE 25 MG/G
CREAM TOPICAL 3 TIMES DAILY PRN
Status: DISCONTINUED | OUTPATIENT
Start: 2025-05-23 | End: 2025-05-27 | Stop reason: HOSPADM

## 2025-05-23 RX ORDER — AMOXICILLIN 250 MG
1 CAPSULE ORAL NIGHTLY PRN
Status: DISCONTINUED | OUTPATIENT
Start: 2025-05-23 | End: 2025-05-27 | Stop reason: HOSPADM

## 2025-05-23 RX ORDER — IBUPROFEN 400 MG/1
800 TABLET, FILM COATED ORAL
Status: DISCONTINUED | OUTPATIENT
Start: 2025-05-24 | End: 2025-05-27 | Stop reason: HOSPADM

## 2025-05-23 RX ORDER — MISOPROSTOL 200 UG/1
800 TABLET ORAL ONCE AS NEEDED
Status: COMPLETED | OUTPATIENT
Start: 2025-05-23 | End: 2025-05-23

## 2025-05-23 RX ORDER — SODIUM CHLORIDE, SODIUM LACTATE, POTASSIUM CHLORIDE, CALCIUM CHLORIDE 600; 310; 30; 20 MG/100ML; MG/100ML; MG/100ML; MG/100ML
INJECTION, SOLUTION INTRAVENOUS CONTINUOUS PRN
Status: DISCONTINUED | OUTPATIENT
Start: 2025-05-23 | End: 2025-05-23

## 2025-05-23 RX ORDER — DEXAMETHASONE SODIUM PHOSPHATE 4 MG/ML
INJECTION, SOLUTION INTRA-ARTICULAR; INTRALESIONAL; INTRAMUSCULAR; INTRAVENOUS; SOFT TISSUE
Status: DISCONTINUED | OUTPATIENT
Start: 2025-05-23 | End: 2025-05-23

## 2025-05-23 RX ORDER — DOCUSATE SODIUM 100 MG/1
200 CAPSULE, LIQUID FILLED ORAL 2 TIMES DAILY
Status: DISCONTINUED | OUTPATIENT
Start: 2025-05-23 | End: 2025-05-27 | Stop reason: HOSPADM

## 2025-05-23 RX ORDER — TRANEXAMIC ACID 10 MG/ML
1000 INJECTION, SOLUTION INTRAVENOUS EVERY 30 MIN PRN
Status: DISCONTINUED | OUTPATIENT
Start: 2025-05-23 | End: 2025-05-27 | Stop reason: HOSPADM

## 2025-05-23 RX ORDER — OXYCODONE HYDROCHLORIDE 5 MG/1
5 TABLET ORAL EVERY 4 HOURS PRN
Status: DISCONTINUED | OUTPATIENT
Start: 2025-05-23 | End: 2025-05-27 | Stop reason: HOSPADM

## 2025-05-23 RX ORDER — ONDANSETRON HYDROCHLORIDE 2 MG/ML
4 INJECTION, SOLUTION INTRAVENOUS EVERY 6 HOURS PRN
Status: DISCONTINUED | OUTPATIENT
Start: 2025-05-23 | End: 2025-05-27 | Stop reason: HOSPADM

## 2025-05-23 RX ORDER — FAMOTIDINE 10 MG/ML
20 INJECTION, SOLUTION INTRAVENOUS
Status: COMPLETED | OUTPATIENT
Start: 2025-05-23 | End: 2025-05-23

## 2025-05-23 RX ORDER — PRENATAL WITH FERROUS FUM AND FOLIC ACID 3080; 920; 120; 400; 22; 1.84; 3; 20; 10; 1; 12; 200; 27; 25; 2 [IU]/1; [IU]/1; MG/1; [IU]/1; MG/1; MG/1; MG/1; MG/1; MG/1; MG/1; UG/1; MG/1; MG/1; MG/1; MG/1
1 TABLET ORAL DAILY
Status: DISCONTINUED | OUTPATIENT
Start: 2025-05-23 | End: 2025-05-27 | Stop reason: HOSPADM

## 2025-05-23 RX ORDER — CARBOPROST TROMETHAMINE 250 UG/ML
250 INJECTION, SOLUTION INTRAMUSCULAR
Status: DISCONTINUED | OUTPATIENT
Start: 2025-05-23 | End: 2025-05-27 | Stop reason: HOSPADM

## 2025-05-23 RX ORDER — MISOPROSTOL 200 UG/1
800 TABLET ORAL ONCE AS NEEDED
Status: DISCONTINUED | OUTPATIENT
Start: 2025-05-23 | End: 2025-05-27 | Stop reason: HOSPADM

## 2025-05-23 RX ORDER — FENTANYL CITRATE 50 UG/ML
INJECTION, SOLUTION INTRAMUSCULAR; INTRAVENOUS
Status: DISCONTINUED | OUTPATIENT
Start: 2025-05-23 | End: 2025-05-23

## 2025-05-23 RX ORDER — LANOLIN ALCOHOL/MO/W.PET/CERES
1 CREAM (GRAM) TOPICAL DAILY
Status: DISCONTINUED | OUTPATIENT
Start: 2025-05-23 | End: 2025-05-27 | Stop reason: HOSPADM

## 2025-05-23 RX ORDER — DIPHENHYDRAMINE HYDROCHLORIDE 50 MG/ML
12.5 INJECTION, SOLUTION INTRAMUSCULAR; INTRAVENOUS
Status: DISCONTINUED | OUTPATIENT
Start: 2025-05-23 | End: 2025-05-27 | Stop reason: HOSPADM

## 2025-05-23 RX ORDER — OXYTOCIN-SODIUM CHLORIDE 0.9% IV SOLN 30 UNIT/500ML 30-0.9/5 UT/ML-%
95 SOLUTION INTRAVENOUS CONTINUOUS PRN
Status: DISCONTINUED | OUTPATIENT
Start: 2025-05-23 | End: 2025-05-27 | Stop reason: HOSPADM

## 2025-05-23 RX ORDER — MORPHINE SULFATE 0.5 MG/ML
INJECTION, SOLUTION EPIDURAL; INTRATHECAL; INTRAVENOUS
Status: DISCONTINUED | OUTPATIENT
Start: 2025-05-23 | End: 2025-05-23

## 2025-05-23 RX ORDER — KETOROLAC TROMETHAMINE 30 MG/ML
30 INJECTION, SOLUTION INTRAMUSCULAR; INTRAVENOUS
Status: COMPLETED | OUTPATIENT
Start: 2025-05-23 | End: 2025-05-24

## 2025-05-23 RX ORDER — SODIUM CITRATE AND CITRIC ACID MONOHYDRATE 334; 500 MG/5ML; MG/5ML
30 SOLUTION ORAL
Status: COMPLETED | OUTPATIENT
Start: 2025-05-23 | End: 2025-05-23

## 2025-05-23 RX ORDER — MIDAZOLAM HYDROCHLORIDE 1 MG/ML
INJECTION INTRAMUSCULAR; INTRAVENOUS
Status: DISCONTINUED | OUTPATIENT
Start: 2025-05-23 | End: 2025-05-23

## 2025-05-23 RX ORDER — SODIUM CHLORIDE, SODIUM LACTATE, POTASSIUM CHLORIDE, CALCIUM CHLORIDE 600; 310; 30; 20 MG/100ML; MG/100ML; MG/100ML; MG/100ML
INJECTION, SOLUTION INTRAVENOUS CONTINUOUS
Status: DISCONTINUED | OUTPATIENT
Start: 2025-05-23 | End: 2025-05-27

## 2025-05-23 RX ORDER — ONDANSETRON HYDROCHLORIDE 2 MG/ML
INJECTION, SOLUTION INTRAVENOUS
Status: DISCONTINUED | OUTPATIENT
Start: 2025-05-23 | End: 2025-05-23

## 2025-05-23 RX ORDER — OXYTOCIN-SODIUM CHLORIDE 0.9% IV SOLN 30 UNIT/500ML 30-0.9/5 UT/ML-%
SOLUTION INTRAVENOUS
Status: DISCONTINUED | OUTPATIENT
Start: 2025-05-23 | End: 2025-05-23

## 2025-05-23 RX ORDER — ONDANSETRON 8 MG/1
8 TABLET, ORALLY DISINTEGRATING ORAL EVERY 8 HOURS PRN
Status: DISCONTINUED | OUTPATIENT
Start: 2025-05-23 | End: 2025-05-27 | Stop reason: HOSPADM

## 2025-05-23 RX ORDER — OXYCODONE HYDROCHLORIDE 10 MG/1
10 TABLET ORAL EVERY 4 HOURS PRN
Status: DISCONTINUED | OUTPATIENT
Start: 2025-05-23 | End: 2025-05-27 | Stop reason: HOSPADM

## 2025-05-23 RX ORDER — PROCHLORPERAZINE EDISYLATE 5 MG/ML
5 INJECTION INTRAMUSCULAR; INTRAVENOUS EVERY 6 HOURS PRN
Status: DISCONTINUED | OUTPATIENT
Start: 2025-05-23 | End: 2025-05-27 | Stop reason: HOSPADM

## 2025-05-23 RX ORDER — NALBUPHINE HYDROCHLORIDE 10 MG/ML
5 INJECTION INTRAMUSCULAR; INTRAVENOUS; SUBCUTANEOUS ONCE AS NEEDED
Status: DISCONTINUED | OUTPATIENT
Start: 2025-05-23 | End: 2025-05-27 | Stop reason: HOSPADM

## 2025-05-23 RX ORDER — BUPIVACAINE HYDROCHLORIDE 7.5 MG/ML
INJECTION INTRAVENOUS
Status: DISCONTINUED | OUTPATIENT
Start: 2025-05-23 | End: 2025-05-23

## 2025-05-23 RX ORDER — SIMETHICONE 80 MG
1 TABLET,CHEWABLE ORAL EVERY 6 HOURS PRN
Status: DISCONTINUED | OUTPATIENT
Start: 2025-05-23 | End: 2025-05-27 | Stop reason: HOSPADM

## 2025-05-23 RX ORDER — HYDROMORPHONE HYDROCHLORIDE 1 MG/ML
0.5 INJECTION, SOLUTION INTRAMUSCULAR; INTRAVENOUS; SUBCUTANEOUS
Status: DISCONTINUED | OUTPATIENT
Start: 2025-05-23 | End: 2025-05-23

## 2025-05-23 RX ORDER — ACETAMINOPHEN 500 MG
1000 TABLET ORAL
Status: COMPLETED | OUTPATIENT
Start: 2025-05-23 | End: 2025-05-23

## 2025-05-23 RX ORDER — ACETAMINOPHEN 325 MG/1
650 TABLET ORAL
Status: DISCONTINUED | OUTPATIENT
Start: 2025-05-23 | End: 2025-05-27 | Stop reason: HOSPADM

## 2025-05-23 RX ORDER — METHYLERGONOVINE MALEATE 0.2 MG/ML
200 INJECTION INTRAVENOUS ONCE AS NEEDED
Status: DISCONTINUED | OUTPATIENT
Start: 2025-05-23 | End: 2025-05-27 | Stop reason: HOSPADM

## 2025-05-23 RX ORDER — DIPHENHYDRAMINE HCL 25 MG
25 CAPSULE ORAL EVERY 6 HOURS PRN
Status: DISCONTINUED | OUTPATIENT
Start: 2025-05-23 | End: 2025-05-27 | Stop reason: HOSPADM

## 2025-05-23 RX ADMIN — Medication 0.1 MG: at 12:05

## 2025-05-23 RX ADMIN — Medication 5 UNITS: at 12:05

## 2025-05-23 RX ADMIN — SODIUM CITRATE AND CITRIC ACID MONOHYDRATE 30 ML: 334; 500 SOLUTION ORAL at 11:05

## 2025-05-23 RX ADMIN — ONDANSETRON 8 MG: 8 TABLET, ORALLY DISINTEGRATING ORAL at 06:05

## 2025-05-23 RX ADMIN — ACETAMINOPHEN 650 MG: 325 TABLET, FILM COATED ORAL at 06:05

## 2025-05-23 RX ADMIN — FAMOTIDINE 20 MG: 10 INJECTION, SOLUTION INTRAVENOUS at 11:05

## 2025-05-23 RX ADMIN — BUPIVACAINE HYDROCHLORIDE IN DEXTROSE 1.6 ML: 7.5 INJECTION, SOLUTION SUBARACHNOID at 12:05

## 2025-05-23 RX ADMIN — KETOROLAC TROMETHAMINE 30 MG: 30 INJECTION, SOLUTION INTRAMUSCULAR; INTRAVENOUS at 01:05

## 2025-05-23 RX ADMIN — Medication 5 UNITS: at 01:05

## 2025-05-23 RX ADMIN — FENTANYL CITRATE 10 MCG: 50 INJECTION, SOLUTION INTRAMUSCULAR; INTRAVENOUS at 12:05

## 2025-05-23 RX ADMIN — OXYCODONE HYDROCHLORIDE 10 MG: 10 TABLET ORAL at 02:05

## 2025-05-23 RX ADMIN — PHENYLEPHRINE HYDROCHLORIDE 0.5 MCG/KG/MIN: 10 INJECTION INTRAVENOUS at 12:05

## 2025-05-23 RX ADMIN — SODIUM CHLORIDE, SODIUM LACTATE, POTASSIUM CHLORIDE, AND CALCIUM CHLORIDE: 600; 310; 30; 20 INJECTION, SOLUTION INTRAVENOUS at 12:05

## 2025-05-23 RX ADMIN — DEXTROSE 2 G: 50 INJECTION, SOLUTION INTRAVENOUS at 12:05

## 2025-05-23 RX ADMIN — ACETAMINOPHEN 1000 MG: 500 TABLET ORAL at 11:05

## 2025-05-23 RX ADMIN — HYDROMORPHONE HYDROCHLORIDE 0.5 MG: 1 INJECTION, SOLUTION INTRAMUSCULAR; INTRAVENOUS; SUBCUTANEOUS at 03:05

## 2025-05-23 RX ADMIN — MIDAZOLAM HYDROCHLORIDE 2 MG: 1 INJECTION, SOLUTION INTRAMUSCULAR; INTRAVENOUS at 12:05

## 2025-05-23 RX ADMIN — DEXAMETHASONE SODIUM PHOSPHATE 4 MG: 4 INJECTION, SOLUTION INTRAMUSCULAR; INTRAVENOUS at 12:05

## 2025-05-23 RX ADMIN — ONDANSETRON HYDROCHLORIDE 4 MG: 2 INJECTION INTRAMUSCULAR; INTRAVENOUS at 12:05

## 2025-05-23 RX ADMIN — MISOPROSTOL 800 MCG: 200 TABLET ORAL at 02:05

## 2025-05-23 RX ADMIN — DOCUSATE SODIUM 200 MG: 100 CAPSULE, LIQUID FILLED ORAL at 08:05

## 2025-05-23 RX ADMIN — KETOROLAC TROMETHAMINE 30 MG: 30 INJECTION, SOLUTION INTRAMUSCULAR; INTRAVENOUS at 08:05

## 2025-05-23 NOTE — TRANSFER OF CARE
Anesthesia Transfer of Care Note    Patient: Amena Theodore    Procedure(s) Performed: Procedure(s) (LRB):   SECTION (N/A)    Patient location: Labor and Delivery    Anesthesia Type: spinal    Transport from OR: Transported from OR on room air with adequate spontaneous ventilation    Post pain: adequate analgesia    Post assessment: no apparent anesthetic complications and tolerated procedure well    Post vital signs: stable    Level of consciousness: awake, alert and oriented    Nausea/Vomiting: no nausea/vomiting    Complications: none    Transfer of care protocol was followed      Last vitals: Visit Vitals  /85   Pulse 82   Temp 36.1 °C (97 °F) (Oral)   Resp 17   LMP 2024   SpO2 97%

## 2025-05-23 NOTE — PROGRESS NOTES
POSTPARTUM PROGRESS NOTE    Subjective:     PPD/POD#: 1   Procedure: Primary LTCS ( Mo-Di twins)   EGA: 35w1d   N/V: No   F/C: No   Abd Pain: Mild, well-controlled with oral pain medication   Lochia: Mild   Voiding: Stanley removed in AM, pending voiding trial   Ambulating: No   Bowel fnc: No   Contraception: Primary OB     Objective:      Temp:  [97 °F (36.1 °C)-98.5 °F (36.9 °C)] 98.5 °F (36.9 °C)  Pulse:  [57-82] 78  Resp:  [14-18] 17  SpO2:  [97 %-100 %] 97 %  BP: (111-145)/(65-95) 111/65    Abdomen: Soft, appropriately tender   Uterus: Firm, no fundal tenderness   Incision: Bandage in place with stable shadowing (marked)     Lab Review    Recent Labs   Lab 05/23/25  1835      K 3.7      CO2 22*   BUN 6   CREATININE 0.5   GLU 93   PROT 5.2*   BILITOT 0.3   ALKPHOS 120   ALT 7*   AST 18       Recent Labs   Lab 05/23/25  1127   WBC 10.25   HGB 10.6*   HCT 31.3*   MCV 91            I/O    Intake/Output Summary (Last 24 hours) at 5/24/2025 0442  Last data filed at 5/24/2025 0150  Gross per 24 hour   Intake --   Output 2068 ml   Net -2068 ml        Assessment and Plan:   Postpartum care:  - Patient doing well.  - Continue routine management and advances.    gHTN  - BP as above  - asymptomatic  - preE labs as above  - UOP: 54 cc/hr  - Mag: not indicated  - Hypertensive agent     Anemia  -  + 376 = 700mL  - AM CBC as above   - asymptomatic  - iron/colace    Jean Marie Lan MD  Obstetrics and Gynecology- PGY2

## 2025-05-23 NOTE — NURSING
Vital signs stable.   Pain controlled: oral and IV  Feeding: BF   Bleeding: Fundus firm, midline, with moderate lochia rubra  C/S: dressing in place     No concerns at this time  Transporting pt to [facility] @ [id number]

## 2025-05-23 NOTE — H&P
HISTORY AND PHYSICAL                                                OBSTETRICS          Subjective:       Amena Theodore is a 19 y.o.  female with IUP at 35w1d weeks gestation who presents for scheduled  delivery. Reports feeling well overall, anxious for surgery.    Patient reports mild contractions, denies vaginal bleeding, denies LOF.   Fetal Movement: normal.    This IUP is complicated by mo-di twins FGR twin B.    Review of Systems   Constitutional:  Negative for chills and fever.   HENT:  Negative for nasal congestion.    Eyes:  Negative for visual disturbance.   Respiratory:  Negative for shortness of breath.    Cardiovascular:  Negative for chest pain.   Gastrointestinal:  Positive for abdominal pain. Negative for constipation, nausea and vomiting.   Endocrine: Negative for diabetes.   Genitourinary:  Negative for dysuria, frequency, vaginal bleeding and vaginal discharge.   Musculoskeletal:  Negative for back pain.   Neurological:  Negative for headaches.   Hematological:  Does not bruise/bleed easily.       PMHx: No past medical history on file.    PSHx: No past surgical history on file.    All: Review of patient's allergies indicates:  No Known Allergies    Meds: Prescriptions Prior to Admission[1]    SH: Social History[2]    FH:   Family History   Problem Relation Name Age of Onset    Diabetes Maternal Aunt         OBHx:   OB History    Para Term  AB Living   1 0 0 0 0 0   SAB IAB Ectopic Multiple Live Births   0 0 0 0 0      # Outcome Date GA Lbr Juan José/2nd Weight Sex Type Anes PTL Lv   1 Current                Objective:       /85   Pulse 82   Temp 97 °F (36.1 °C) (Oral)   Resp 17   LMP 2024   SpO2 97%     Vitals:    25 1100   BP: 128/85   Pulse: 82   Resp: 17   Temp: 97 °F (36.1 °C)   TempSrc: Oral   SpO2: 97%       General:   alert, appears stated age and cooperative, no apparent distress   HENT:  normocephalic, atraumatic   Eyes:  extraocular movements  and conjunctivae normal   Neck:  supple, range of motion normal, no thyromegaly   Lungs:   no respiratory distress   Heart:   regular rate   Abdomen:  soft, non-tender, non-distended but gravid, no rebound or guarding    Extremities negative edema, negative erythema   FHT: A 130, B 130, moderate BTBV, +accels, -decels;  Cat 1 (reassuring)                 TOCO: Q2-3 min   Presentations: cephalic/cephalic by ultrasound   Cervix: Deferred    Sterile Speculum Exam: n/a    EFW by Leopold's: not performed     Recent Growth Scan: A 2007g/21% AC 45%,  B 1589g/6% AC 5%    Lab Review  Blood Type O  GBBS: negative  Rubella: Immune  RPR: NR  HIV: negative  HepB: negative       Assessment:       35w1d weeks gestation presenting for scheduled  section.    Active Hospital Problems    Diagnosis  POA    *Status post  delivery [Z98.891]  Not Applicable      Resolved Hospital Problems   No resolved problems to display.          Plan:      Delivery  - Risks, benefits, alternatives and possible complications have been discussed in detail with the patient.   - Consents signed and to chart  - Admit to Labor and Delivery unit  - Epidural per Anesthesia  - Draw CBC, T&S  - Notify Staff  - Plan: to OR for primary c/s     2. Mo-Di Twins   - Growth as above   - Discordance 20.8%  - To OR for pLTCS     3. FGR  - sFGR twin B, initially type 3 but now type 1  - To OR for pLTCS      Post-Partum Hemorrhage risk - medium    Ivonne Stallworth MD  OBGYN, PGY-4           [1]   Medications Prior to Admission   Medication Sig Dispense Refill Last Dose/Taking    aspirin (ECOTRIN) 81 MG EC tablet Take 1 tablet (81 mg total) by mouth once daily. 90 tablet 3     ferrous sulfate 324 mg (65 mg iron) TbEC Take 1 tablet (324 mg total) by mouth every other day. 45 tablet 1     prenatal vit no.124/iron/folic (PRENATAL VITAMIN ORAL) Take by mouth.      [2]   Social History  Socioeconomic History    Marital status: Single   Tobacco Use     Smoking status: Never    Smokeless tobacco: Never   Substance and Sexual Activity    Alcohol use: Not Currently    Drug use: Not Currently    Sexual activity: Yes     Partners: Male     Social Drivers of Health     Financial Resource Strain: Medium Risk (5/16/2025)    Overall Financial Resource Strain (CARDIA)     Difficulty of Paying Living Expenses: Somewhat hard   Food Insecurity: Food Insecurity Present (5/16/2025)    Hunger Vital Sign     Worried About Running Out of Food in the Last Year: Sometimes true     Ran Out of Food in the Last Year: Sometimes true   Transportation Needs: No Transportation Needs (5/16/2025)    PRAPARE - Transportation     Lack of Transportation (Medical): No     Lack of Transportation (Non-Medical): No   Physical Activity: Insufficiently Active (5/16/2025)    Exercise Vital Sign     Days of Exercise per Week: 4 days     Minutes of Exercise per Session: 30 min   Stress: No Stress Concern Present (5/16/2025)    [employer] [place] [employer] - Occupational Stress Questionnaire     Feeling of Stress : Only a little   Housing Stability: Low Risk  (5/16/2025)    Housing Stability Vital Sign     Unable to Pay for Housing in the Last Year: No     Number of Times Moved in the Last Year: 1     Homeless in the Last Year: No

## 2025-05-23 NOTE — ANESTHESIA PREPROCEDURE EVALUATION
"[facility]  Obstetric Anesthesia Pre-Procedure Evaluation         Patient Name: Amena Theodore  YOB: 2005  MRN: [id number]    2025    SUBJECTIVE:     Amena Theodore is a 19 y.o. female  at 35w1d who presents scheduled  . Current IUP has been complicated by MonoDi.  [place], Baby A has a right pelvic kidney. Baby B with FGR.    She denies history of HTN, asthma, bleeding or coagulation disorders, spine abnormalities, or previous back surgeries.      OB History    Para Term  AB Living   1        SAB IAB Ectopic Multiple Live Births             # Outcome Date GA Lbr Juan José/2nd Weight Sex Type Anes PTL Lv   1 Current                Review of patient's allergies indicates:  No Known Allergies    Problem List[1]    No past surgical history on file.    Tobacco Use: Low Risk  (2025)    Patient History     Smoking Tobacco Use: Never     Smokeless Tobacco Use: Never     Passive Exposure: Not on file     Alcohol Use: Not At Risk (2025)    AUDIT-C     Frequency of Alcohol Consumption: Never     Average Number of Drinks: Patient does not drink     Frequency of Binge Drinking: Never     Social History     Substance and Sexual Activity   Drug Use Not Currently       OBJECTIVE:     Vital Signs:  Pulse:  [79]   Resp:  [16]   BP: (127-134)/(74-84)   SpO2:  [100 %]       Wt Readings from Last 1 Encounters:   25 1439 69.8 kg (153 lb 14.1 oz)       BP Readings from Last 3 Encounters:   25 134/84   25 134/84   25 127/74       Significant Labs    Heme Profile  Lab Results   Component Value Date    WBC 8.70 2025    HGB 11.0 (L) 2025    HCT 34.3 (L) 2025     2025       Coagulation Studies  No results found for: "LABPROT", "INR", "APTT"    BMP  Lab Results   Component Value Date     2025    K 3.8 2025     2025    CO2 23 2025    BUN 7 2025    CREATININE 0.6 2025 " "      Liver Function Tests  Lab Results   Component Value Date    AST 16 05/07/2025    ALT 8 (L) 05/07/2025    ALKPHOS 139 05/07/2025    BILITOT 0.4 05/07/2025    PROT 6.5 05/07/2025    ALBUMIN 2.6 (L) 05/07/2025       Endocrine Profile  No results found for: "HGBA1C", "TSH"    Cardiac Studies    EKG:   No results found for this or any previous visit.      ASSESSMENT/PLAN:       Pre-op Assessment    I have reviewed the Patient Summary Reports.     I have reviewed the Nursing Notes. I have reviewed the NPO Status.   I have reviewed the Medications.     Review of Systems  Anesthesia Hx:  No problems with previous Anesthesia             Denies Family Hx of Anesthesia complications.    Denies Personal Hx of Anesthesia complications.                    Social:  Non-Smoker       Hematology/Oncology:  Hematology Normal   Oncology Normal                                   EENT/Dental:  EENT/Dental Normal           Cardiovascular:  Cardiovascular Normal                                              Pulmonary:  Pulmonary Normal                       Renal/:  Renal/ Normal                 Hepatic/GI:  Hepatic/GI Normal                    Musculoskeletal:  Musculoskeletal Normal                Neurological:  Neurology Normal                                      Endocrine:  Endocrine Normal            Dermatological:  Skin Normal    Psych:  Psychiatric Normal                    Physical Exam  General: Cooperative, Alert and Oriented        Anesthesia Plan  Type of Anesthesia, risks & benefits discussed:    Anesthesia Type: Gen ETT, Epidural, Spinal, CSE  Intra-op Monitoring Plan: Standard ASA Monitors  Post Op Pain Control Plan: multimodal analgesia and IV/PO Opioids PRN  Induction:  IV  Airway Plan: Direct and Video  Informed Consent: Informed consent signed with the Patient and all parties understand the risks and agree with anesthesia plan.  All questions answered.   ASA Score: 2  Day of Surgery Review of History & " Physical: H&P Update referred to the surgeon/provider.    Ready For Surgery From Anesthesia Perspective.     .           [1]   Patient Active Problem List  Diagnosis    Monochorionic diamniotic twin gestation in third trimester    Early onset selective FGR, fetus 2    Maternal iron deficiency anemia affecting pregnancy in third trimester, antepartum

## 2025-05-23 NOTE — BRIEF OP NOTE
[facility] - Labor & Delivery  Brief Operative Note    SUMMARY     Surgery Date: 2025     Surgeons and Role:     * Mitzi Nava MD - Primary     * Hien Eng MD    Assisting Surgeon: None    Pre-op Diagnosis:  Monochorionic diamniotic twin gestation in second trimester [O30.032]    Post-op Diagnosis:  Post-Op Diagnosis Codes:     * Monochorionic diamniotic twin gestation in second trimester [O30.032]    Procedure(s) (LRB):   SECTION (N/A)    Anesthesia: Spinal/Epidural    Implants:  * No implants in log *    Operative Findings:   Normal uterine outline, bilateral tubes and ovaries  Possible minor endometriosis with blood vessel blebs to left posterior lower uterine segment, hemostatic after bovie  Vigorous male infants x2 in the cephalic presentation with clear fluid on AROM    Estimated Blood Loss:400cc    Estimated Blood Loss has been documented.         Specimens:   Specimen (24h ago, onward)      None          * No specimens in log *    [id number]

## 2025-05-23 NOTE — ANESTHESIA PROCEDURE NOTES
Spinal    Diagnosis: IUP  Patient location during procedure: OR    Staffing  Authorizing Provider: Arash Garner III, MD  Performing Provider: Carlene Damon DO    Staffing  Performed by: Carlene Damon DO  Authorized by: Arash Garner III, MD    Preanesthetic Checklist  Completed: patient identified, IV checked, site marked, risks and benefits discussed, surgical consent, monitors and equipment checked, pre-op evaluation and timeout performed  Spinal Block  Patient position: sitting  Prep: ChloraPrep  Patient monitoring: continuous pulse ox and frequent blood pressure checks  Approach: midline  Location: L3-4  Injection technique: single shot  CSF Fluid: clear free-flowing CSF  Needle  Needle type: pencil-tip   Needle gauge: 25 G  Needle length: 3.5 in  Additional Documentation: incremental injection and no paresthesia on injection  Needle localization: anatomical landmarks  Assessment  Sensory level: T4   Dermatomal levels determined by pinch or prick  Ease of block: easy  Patient's tolerance of the procedure: comfortable throughout block

## 2025-05-23 NOTE — CARE UPDATE
Approximately 300cc clot expressed during fundal massage  800mcg cytotec placed rectally to assist with atony

## 2025-05-23 NOTE — OPERATIVE NOTE ADDENDUM
Certification of Assistant at Surgery       Surgery Date: 2025     Participating Surgeons:  Surgeons and Role:     * Mitzi Nava MD - Primary     * Hien Eng MD    Procedures:  Procedure(s) (LRB):   SECTION (N/A)    Assistant Surgeon's Certification of Necessity:  I understand that section 1842 (b) (6) (d) of the Social Security Act generally prohibits Medicare Part B reasonable charge payment for the services of assistants at surgery in teaching hospitals when qualified residents are available to furnish such services. I certify that the services for which payment is claimed were medically necessary, and that no qualified resident was available to perform the services. I further understand that these services are subject to post-payment review by the Medicare carrier.      Hien Eng MD    2025  3:15 PM

## 2025-05-23 NOTE — L&D DELIVERY NOTE
Section Procedure Note    Procedure:   1. Primary  Section via Pfannenstiel skin incision    Indications:   1. Mono-Di twin gestation  2. Severe growth restriction of baby B    Pre-operative Diagnosis:   1. IUP at 35 week 1 day pregnancy  2. Mono-Di twin gestation  3. Severe growth restriction of baby B    Post-operative Diagnosis:   same    Surgeon: Mitzi Nava MD    Assistants: Hien Eng MD    Anesthesia: Spinal anesthesia    Operative Findings:   Normal uterine outline, bilateral tubes and ovaries  Possible minor endometriosis with blood vessel blebs to left posterior lower uterine segment, hemostatic after bovie  Vigorous male infants x2 in the cephalic presentation with clear fluid on AROM  Normal Mono-Di placenta    Estimated Blood Loss:  400 mL           Total IV Fluids: see anesthesia report     UOP:see anesthesia report    Specimens: placenta    PreOp CBC:   Lab Results   Component Value Date    WBC 10.25 2025    HGB 10.6 (L) 2025    HCT 31.3 (L) 2025    MCV 91 2025     2025                     Complications:  None; patient tolerated the procedure well.           Disposition: PACU - hemodynamically stable.           Condition: stable    Procedure Details   The patient was seen in the Holding Room. The risks, benefits, complications, treatment options, and expected outcomes were discussed with the patient.  The patient concurred with the proposed plan, giving informed consent.  The patient was taken to Operating Room, identified as Amena Theodore and the procedure verified as Primary  Delivery. A Time Out was held and the above information confirmed.    After induction of anesthesia, the patient was prepped and draped in the usual sterile manner while placed in a dorsal supine position with a left lateral tilt.  A vidales catheter was also placed per nursing. Preoperative antibiotics Ancef 2g were administered. An allis test was performed  confirming adequate anesthesia.  A Pfannenstiel incision was made and carried down through the subcutaneous tissue to the fascia. Fascial incision was made and extended transversely. The fascia was grasped with [facility] clamps and  from the underlying rectus tissue superiorly and inferiorly. The peritoneum was identified, found to be free of adherent bowel, and entered Bluntly. Peritoneal incision was extended longitudinally. The vesico-uterine peritoneum was identified, and bladder blade was inserted. The vesico-uterine peritoneum was incised transversely and the bladder flap was bluntly freed from the lower uterine segment. The bladder blade was reinserted to keep the bladder out of the operative field. A low transverse uterine incision was made with knife and extended with finger fracture. The amniotic sac was ruptured with hemostat and the infant was noted to be in cephalic presentation in clear liquid. The head was brought to the incision and elevated out of the pelvis. The patient delivered a viable male infant without difficulty. The same was done for twin B in cephalic presentation. Delayed cord clamping was not performed. Infants were passed to awaiting NICU nurses. After the umbilical cord was clamped and cut, cord blood was obtained for evaluation. The placenta was removed intact, appeared normal, and was sent to pathology. The uterus was exteriorized. The uterine incision was closed with running locked sutures of #1 chromic. Hemostasis was observed after figure of eight was placed at midline. The uterine outline, tubes and ovaries appeared normal. Mild area of bleeding noted on posterior uterine body that is spacious for endometriosis.  The uterus was returned to the abdominal cavity. Incision was reinspected and good hemostasis was noted. The abdominal cavity was irrigated to remove clots. The peritoneum was reapproximated with vicryl. Muscle was reapproximated with chromic in interrupted  "morgan. The fascia was then reapproximated with running sutures of PDS. The subcutaneous fat was reapproximated with vicru;, and skin was reapproximated with 4-0 monocryl.    Instrument, sponge, and needle counts were correct prior the abdominal closure and at the conclusion of the case.     Pt tolerated procedure well and was in stable condition after the procedure.      **NOTE: This patient is a candidate for trial of labor after  delivery**  Mitzi Nava M.D.          Arben, A Jeffrey Beckwith [[id number]]   Delivery Information for A Jeffrey Theodore    Birth information:  YOB: 2026   Time of birth: 12:44 PM   Sex: male   Head Delivery Date/Time: 2025 12:44 PM   Delivery type: , Low Transverse   Gestational Age: 35w1d       Delivery Providers    Delivering clinician: Mitzi Nava MD   Provider Role    Hien Eng MD Gras, Bryce, Margo Hawley,                Measurements    Weight: 2190 g  Weight (lbs): 4 lb 13.3 oz  Length: 43.2 cm  Length (in): 17"  Head circumference: 33.5 cm  Chest circumference: 27.8 cm         Apgars    Living status: Living  Apgar Component Scores:  1 min.:  5 min.:  10 min.:  15 min.:  20 min.:    Skin color:  0  1       Heart rate:  2  2       Reflex irritability:  2  2       Muscle tone:  2  2       Respiratory effort:  2  2       Total:  8  9       Apgars assigned by: NICU         Operative Delivery    Forceps attempted?: No  Vacuum extractor attempted?: No         Shoulder Dystocia    Shoulder dystocia present?: No           Presentation    Presentation: Vertex           Interventions/Resuscitation    Method: NICU Attended, Bulb Suctioning, Tactile Stimulation, Deep Suctioning, CPAP, PPV       Cord    Vessels: 3 vessels  Complications: None  Delayed Cord Clamping?: No  Cord Clamped Date/Time: 2025 12:45 PM  Cord Blood Disposition: Lab  Gases Sent?: No  Stem Cell Collection (by MD): No       Placenta  "   Placenta delivery date/time: 2025 12:46  Placenta removal: Manual removal  Placenta appearance: Intact  Placenta disposition: Pathology           Labor Events:       labor: Yes     Labor Onset Date/Time:         Dilation Complete Date/Time:         Start Pushing Date/Time:       Rupture Date/Time:            Rupture type:          Fluid Amount:       Fluid Color:        steroids: Partial Course     Antibiotics given for GBS: No     Induction:       Indications for induction:        Augmentation:       Indications for augmentation:       Labor complications: None     Additional complications:          Cervical ripening:                     Delivery:      Episiotomy:       Indication for Episiotomy:       Perineal Lacerations:   Repaired:      Periurethral Laceration:   Repaired:     Labial Laceration:   Repaired:     Sulcus Laceration:   Repaired:     Vaginal Laceration:   Repaired:     Cervical Laceration:   Repaired:     Repair suture:       Repair # of packets:       Last Value - EBL - Nursing (mL):       Sum - EBL - Nursing (mL): 0     Last Value - EBL - Anesthesia (mL):      Calculated QBL (mL): 400     Running total QBL (mL): 768     Vaginal Sweep Performed:       Surgicount Correct:         Other providers:       Anesthesia    Method: Spinal          Details (if applicable):  Trial of Labor No    Categorization: Primary    Priority:     Indications for : Multiple Gestation   Incision Type: low transverse     Additional  information:  Forceps:    Vacuum:    Breech:    Observed anomalies    Other (Comments):            KENNEDY Theodore [[id number]]   Delivery Information for KENNEDY Theodore    Birth information:  YOB: 2026   Time of birth: 12:44 PM   Sex: male   Head Delivery Date/Time: 2025 12:44 PM   Delivery type: , Low Transverse   Gestational Age: 35w1d       Delivery Providers    Delivering clinician: Ranger  "Mitzi RODRÍGUEZ MD   Provider Role    Hien Eng MD Gras, Bryce, Margo Hawley, ST               Measurements    Weight: 1820 g  Weight (lbs): 4 lb 0.2 oz  Length: 42.9 cm  Length (in): 16.89"         Apgars    Living status: Living  Apgar Component Scores:  1 min.:  5 min.:  10 min.:  15 min.:  20 min.:    Skin color:  0  1       Heart rate:  2  2       Reflex irritability:  2  2       Muscle tone:  2  2       Respiratory effort:  2  2       Total:  8  9       Apgars assigned by: NICU         Operative Delivery    Forceps attempted?: No  Vacuum extractor attempted?: No         Shoulder Dystocia    Shoulder dystocia present?: No           Presentation    Presentation: Vertex           Interventions/Resuscitation    Method: NICU Attended       Cord    Vessels: 3 vessels  Complications: None  Delayed Cord Clamping?: No  Cord Clamped Date/Time: 2025  1:15 PM  Cord Blood Disposition: Lab  Gases Sent?: No  Stem Cell Collection (by MD): No       Placenta    Placenta delivery date/time: 2025 13:16  Placenta removal: Manual removal  Placenta appearance: Intact  Placenta disposition: Pathology           Labor Events:       labor: Yes     Labor Onset Date/Time:         Dilation Complete Date/Time:         Start Pushing Date/Time:       Rupture Date/Time:            Rupture type:          Fluid Amount:       Fluid Color:        steroids: Partial Course     Antibiotics given for GBS: No     Induction:       Indications for induction:        Augmentation:       Indications for augmentation:       Labor complications: None     Additional complications:          Cervical ripening:                     Delivery:      Episiotomy:       Indication for Episiotomy:       Perineal Lacerations:   Repaired:      Periurethral Laceration:   Repaired:     Labial Laceration:   Repaired:     Sulcus Laceration:   Repaired:     Vaginal Laceration:   Repaired:     Cervical Laceration:   Repaired:   "   Repair suture:       Repair # of packets:       Last Value - EBL - Nursing (mL):       Sum - EBL - Nursing (mL): 0     Last Value - EBL - Anesthesia (mL):      Calculated QBL (mL): 400     Running total QBL (mL): 768     Vaginal Sweep Performed:       Surgicount Correct:         Other providers:       Anesthesia    Method: Spinal          Details (if applicable):  Trial of Labor No    Categorization: Primary    Priority: Routine   Indications for : Multiple Gestation   Incision Type: low transverse     Additional  information:  Forceps:    Vacuum:    Breech:    Observed anomalies    Other (Comments):

## 2025-05-24 PROBLEM — D62 ABLA (ACUTE BLOOD LOSS ANEMIA): Status: ACTIVE | Noted: 2025-05-24

## 2025-05-24 LAB
ABSOLUTE EOSINOPHIL (OHS): 0.01 K/UL
ABSOLUTE MONOCYTE (OHS): 0.7 K/UL (ref 0.3–1)
ABSOLUTE NEUTROPHIL COUNT (OHS): 8.03 K/UL (ref 1.8–7.7)
BASOPHILS # BLD AUTO: 0.02 K/UL
BASOPHILS NFR BLD AUTO: 0.2 %
ERYTHROCYTE [DISTWIDTH] IN BLOOD BY AUTOMATED COUNT: 13.3 % (ref 11.5–14.5)
HCT VFR BLD AUTO: 23.1 % (ref 37–48.5)
HGB BLD-MCNC: 7.5 GM/DL (ref 12–16)
IMM GRANULOCYTES # BLD AUTO: 0.06 K/UL (ref 0–0.04)
IMM GRANULOCYTES NFR BLD AUTO: 0.6 % (ref 0–0.5)
LYMPHOCYTES # BLD AUTO: 1.49 K/UL (ref 1–4.8)
MCH RBC QN AUTO: 30 PG (ref 27–31)
MCHC RBC AUTO-ENTMCNC: 32.5 G/DL (ref 32–36)
MCV RBC AUTO: 92 FL (ref 82–98)
NUCLEATED RBC (/100WBC) (OHS): 0 /100 WBC
PLATELET # BLD AUTO: 183 K/UL (ref 150–450)
PMV BLD AUTO: 11.1 FL (ref 9.2–12.9)
RBC # BLD AUTO: 2.5 M/UL (ref 4–5.4)
RELATIVE EOSINOPHIL (OHS): 0.1 %
RELATIVE LYMPHOCYTE (OHS): 14.5 % (ref 18–48)
RELATIVE MONOCYTE (OHS): 6.8 % (ref 4–15)
RELATIVE NEUTROPHIL (OHS): 77.8 % (ref 38–73)
WBC # BLD AUTO: 10.31 K/UL (ref 3.9–12.7)

## 2025-05-24 PROCEDURE — 11000001 HC ACUTE MED/SURG PRIVATE ROOM

## 2025-05-24 PROCEDURE — 63600175 PHARM REV CODE 636 W HCPCS: Mod: JZ,TB

## 2025-05-24 PROCEDURE — 99232 SBSQ HOSP IP/OBS MODERATE 35: CPT | Mod: ,,, | Performed by: OBSTETRICS & GYNECOLOGY

## 2025-05-24 PROCEDURE — 36415 COLL VENOUS BLD VENIPUNCTURE: CPT

## 2025-05-24 PROCEDURE — 25000003 PHARM REV CODE 250

## 2025-05-24 PROCEDURE — 85025 COMPLETE CBC W/AUTO DIFF WBC: CPT

## 2025-05-24 RX ORDER — IBUPROFEN 800 MG/1
800 TABLET, FILM COATED ORAL EVERY 8 HOURS
Qty: 30 TABLET | Refills: 1 | Status: SHIPPED | OUTPATIENT
Start: 2025-05-24

## 2025-05-24 RX ORDER — DOCUSATE SODIUM 100 MG/1
200 CAPSULE, LIQUID FILLED ORAL 2 TIMES DAILY
Qty: 30 CAPSULE | Refills: 1 | Status: SHIPPED | OUTPATIENT
Start: 2025-05-24

## 2025-05-24 RX ORDER — ACETAMINOPHEN 325 MG/1
650 TABLET ORAL EVERY 6 HOURS
Qty: 30 TABLET | Refills: 1 | Status: SHIPPED | OUTPATIENT
Start: 2025-05-24

## 2025-05-24 RX ORDER — OXYCODONE HYDROCHLORIDE 5 MG/1
5 TABLET ORAL EVERY 4 HOURS PRN
Qty: 20 TABLET | Refills: 0 | Status: SHIPPED | OUTPATIENT
Start: 2025-05-24

## 2025-05-24 RX ADMIN — IBUPROFEN 800 MG: 400 TABLET ORAL at 06:05

## 2025-05-24 RX ADMIN — KETOROLAC TROMETHAMINE 30 MG: 30 INJECTION, SOLUTION INTRAMUSCULAR; INTRAVENOUS at 11:05

## 2025-05-24 RX ADMIN — PRENATAL VIT W/ FE FUMARATE-FA TAB 27-0.8 MG 1 TABLET: 27-0.8 TAB at 08:05

## 2025-05-24 RX ADMIN — ACETAMINOPHEN 650 MG: 325 TABLET, FILM COATED ORAL at 11:05

## 2025-05-24 RX ADMIN — ACETAMINOPHEN 650 MG: 325 TABLET, FILM COATED ORAL at 06:05

## 2025-05-24 RX ADMIN — DOCUSATE SODIUM 200 MG: 100 CAPSULE, LIQUID FILLED ORAL at 08:05

## 2025-05-24 RX ADMIN — ACETAMINOPHEN 650 MG: 325 TABLET, FILM COATED ORAL at 05:05

## 2025-05-24 RX ADMIN — DIPHENHYDRAMINE HYDROCHLORIDE 25 MG: 25 CAPSULE ORAL at 02:05

## 2025-05-24 RX ADMIN — FERROUS SULFATE TAB 325 MG (65 MG ELEMENTAL FE) 1 EACH: 325 (65 FE) TAB at 08:05

## 2025-05-24 RX ADMIN — KETOROLAC TROMETHAMINE 30 MG: 30 INJECTION, SOLUTION INTRAMUSCULAR; INTRAVENOUS at 05:05

## 2025-05-24 NOTE — PROGRESS NOTES
POSTPARTUM PROGRESS NOTE    Subjective:     PPD/POD#: 2   Procedure: Primary LTCS ( Mo-Di twins)   EGA: 35w1d   N/V: No   F/C: No   Abd Pain: Mild, well-controlled with oral pain medication   Lochia: Mild   Voiding: Yes   Ambulating: Yes   Bowel fnc: Yes   Contraception: Primary OB     Patient reports that she is feeling well this AM. She reports that her pain is well controlled with PO pain medications. She is ambulating and voiding without difficulty. She is tolerating PO without nausea or vomiting. She continues to deny any lightheadedness or dizziness with ambulation. She denies shortness of breath, however she is agreeable to a blood transfusion this AM.    Objective:      Temp:  [98 °F (36.7 °C)-98.6 °F (37 °C)] 98 °F (36.7 °C)  Pulse:  [81-91] 81  Resp:  [14-17] 17  SpO2:  [97 %-98 %] 97 %  BP: (106-119)/(58-75) 107/58    Abdomen: Soft, appropriately tender   Uterus: Firm, no fundal tenderness   Incision: Bandage in place with stable shadowing (marked)     Lab Review    Recent Labs   Lab 05/23/25  1835      K 3.7      CO2 22*   BUN 6   CREATININE 0.5   GLU 93   PROT 5.2*   BILITOT 0.3   ALKPHOS 120   ALT 7*   AST 18       Recent Labs   Lab 05/23/25  1127 05/24/25  0634   WBC 10.25 10.31   HGB 10.6* 7.5*   HCT 31.3* 23.1*   MCV 91 92    183       I/O    Intake/Output Summary (Last 24 hours) at 5/25/2025 0604  Last data filed at 5/24/2025 1630  Gross per 24 hour   Intake --   Output 1300 ml   Net -1300 ml        Assessment and Plan:   Postpartum care:  - Patient doing well.  - Continue routine management and advances.    gHTN  - BP as above  - asymptomatic  - preE labs as above  - UOP: 350cc + multiple unmeasured voids  - Mag: not indicated  - Hypertensive agent     Anemia  -  + 376 = 700mL  - CBC as above   - Patient was counseled on a blood transfusion 5/24, however declined as she was asymptomatic   - Patient re-evaluated for a blood transfusion this AM and she desires a transfusion  at this time   - 1u pRBCs ordered, RN aware   - Will obtain a repeat CBC tomorrow AM  - asymptomatic  - iron/colace    Angelic Sood MD  Obstetrics and Gynecology - PGY2

## 2025-05-24 NOTE — LACTATION NOTE
This note was copied from a baby's chart.  Mom notified nurse of no longer pumping or wanting to breastfeed infant.     Benefits of mother's own milk discussed with mother. Mother encouraged to provide human milk for her baby to promote growth and development and to reduce the risk of illness and disease. Benefits of breastfeeding and risks of not breastfeeding handouts given.    Informed mom that infant will be introduced to formula soon to transition off of donor ebm. Mom verbalized understanding.

## 2025-05-24 NOTE — ANESTHESIA POSTPROCEDURE EVALUATION
Anesthesia Post Evaluation    Patient: Amena Theodore    Procedure(s) Performed: Procedure(s) (LRB):   SECTION (N/A)    Final Anesthesia Type: spinal      Patient location during evaluation: labor & delivery  Patient participation: Yes- Able to Participate  Level of consciousness: awake and alert and oriented  Post-procedure vital signs: reviewed and stable  Pain management: adequate  Airway patency: patent  SABINA mitigation strategies: Use of major conduction anesthesia (spinal/epidural) or peripheral nerve block and Multimodal analgesia  PONV status at discharge: No PONV  Anesthetic complications: no      Cardiovascular status: hemodynamically stable  Respiratory status: unassisted, spontaneous ventilation and room air  Hydration status: euvolemic  Follow-up not needed.  Comments: Doing well, ambulating, voiding, and tolerating regular diet. No numbness or paresthesias BLE. No HA.              Vitals Value Taken Time   /61 25 11:55   Temp 36.8 °C (98.3 °F) 25 11:55   Pulse 85 25 11:55   Resp 14 25 11:55   SpO2 98 % 25 11:55         No case tracking events are documented in the log.      Pain/Tracey Score: Pain Rating Prior to Med Admin: 7 (2025 11:43 AM)  Pain Rating Post Med Admin: 3 (2025  4:00 PM)

## 2025-05-24 NOTE — LACTATION NOTE
This note was copied from a baby's chart.  Mother of pt requesting formula for baby and no longer wishes to breastfeed, offered her a pump and she refused. Informed pt on option to use donors milk, pt preferred formula. Educated pt on proper prep and use of formula, verbalized understanding.

## 2025-05-24 NOTE — CARE UPDATE
Resident to bedside to discuss drop in H/H following ABLA and PPH, s/p cytotec and methergine.    H/H significant at 7.5/29. PT counseled regarding option of transfusion if she is symptomatic. Pt denies headache, lightheadedness, dizziness, or SOB with ambulation. Does not wish to pursue transfusion at this time. Pt counseled to reach out to nursing if symptoms occur.       Shweta Montenegro MD (Mary)   Obstetrics and Gynecology, PGY1

## 2025-05-25 LAB
ABO GROUP BLD: NORMAL
RH BLD: NORMAL

## 2025-05-25 PROCEDURE — 25000003 PHARM REV CODE 250

## 2025-05-25 PROCEDURE — 99232 SBSQ HOSP IP/OBS MODERATE 35: CPT | Mod: ,,, | Performed by: OBSTETRICS & GYNECOLOGY

## 2025-05-25 PROCEDURE — 11000001 HC ACUTE MED/SURG PRIVATE ROOM

## 2025-05-25 PROCEDURE — 86920 COMPATIBILITY TEST SPIN: CPT

## 2025-05-25 RX ORDER — HYDROCODONE BITARTRATE AND ACETAMINOPHEN 500; 5 MG/1; MG/1
TABLET ORAL
Status: DISCONTINUED | OUTPATIENT
Start: 2025-05-25 | End: 2025-05-25

## 2025-05-25 RX ADMIN — PRENATAL VIT W/ FE FUMARATE-FA TAB 27-0.8 MG 1 TABLET: 27-0.8 TAB at 10:05

## 2025-05-25 RX ADMIN — IBUPROFEN 800 MG: 400 TABLET ORAL at 02:05

## 2025-05-25 RX ADMIN — ACETAMINOPHEN 650 MG: 325 TABLET, FILM COATED ORAL at 09:05

## 2025-05-25 RX ADMIN — DOCUSATE SODIUM 200 MG: 100 CAPSULE, LIQUID FILLED ORAL at 09:05

## 2025-05-25 RX ADMIN — FERROUS SULFATE TAB 325 MG (65 MG ELEMENTAL FE) 1 EACH: 325 (65 FE) TAB at 10:05

## 2025-05-25 RX ADMIN — IBUPROFEN 800 MG: 400 TABLET ORAL at 03:05

## 2025-05-25 RX ADMIN — DOCUSATE SODIUM 200 MG: 100 CAPSULE, LIQUID FILLED ORAL at 10:05

## 2025-05-25 RX ADMIN — ACETAMINOPHEN 650 MG: 325 TABLET, FILM COATED ORAL at 02:05

## 2025-05-25 RX ADMIN — IBUPROFEN 800 MG: 400 TABLET ORAL at 09:05

## 2025-05-25 RX ADMIN — ACETAMINOPHEN 650 MG: 325 TABLET, FILM COATED ORAL at 05:05

## 2025-05-26 DIAGNOSIS — O30.039 MONOCHORIONIC DIAMNIOTIC TWIN PREGNANCY, ANTEPARTUM: Primary | ICD-10-CM

## 2025-05-26 PROCEDURE — 99232 SBSQ HOSP IP/OBS MODERATE 35: CPT | Mod: ,,, | Performed by: OBSTETRICS & GYNECOLOGY

## 2025-05-26 PROCEDURE — 25000003 PHARM REV CODE 250

## 2025-05-26 PROCEDURE — 11000001 HC ACUTE MED/SURG PRIVATE ROOM

## 2025-05-26 RX ADMIN — IBUPROFEN 800 MG: 400 TABLET ORAL at 04:05

## 2025-05-26 RX ADMIN — ACETAMINOPHEN 650 MG: 325 TABLET, FILM COATED ORAL at 03:05

## 2025-05-26 RX ADMIN — IBUPROFEN 800 MG: 400 TABLET ORAL at 08:05

## 2025-05-26 RX ADMIN — PRENATAL VIT W/ FE FUMARATE-FA TAB 27-0.8 MG 1 TABLET: 27-0.8 TAB at 08:05

## 2025-05-26 RX ADMIN — DOCUSATE SODIUM 200 MG: 100 CAPSULE, LIQUID FILLED ORAL at 08:05

## 2025-05-26 RX ADMIN — DIPHENHYDRAMINE HYDROCHLORIDE 25 MG: 25 CAPSULE ORAL at 10:05

## 2025-05-26 RX ADMIN — ACETAMINOPHEN 650 MG: 325 TABLET, FILM COATED ORAL at 04:05

## 2025-05-26 RX ADMIN — FERROUS SULFATE TAB 325 MG (65 MG ELEMENTAL FE) 1 EACH: 325 (65 FE) TAB at 08:05

## 2025-05-26 RX ADMIN — ACETAMINOPHEN 650 MG: 325 TABLET, FILM COATED ORAL at 10:05

## 2025-05-26 NOTE — PROGRESS NOTES
POSTPARTUM PROGRESS NOTE    Subjective:     PPD/POD#: 3   Procedure: Primary LTCS ( Mo-Di twins)   EGA: 35w1d   N/V: No   F/C: No   Abd Pain: Mild, well-controlled with oral pain medication   Lochia: Mild   Voiding: Yes   Ambulating: Yes   Bowel fnc: Yes   Contraception: Per primary OB     Patient reports that she is feeling well this AM. She reports that her pain is well controlled with PO pain medications. She is ambulating and voiding without difficulty. She is tolerating PO without nausea or vomiting. She continues to deny any lightheadedness, dizziness, or SOB with ambulation.     Objective:      Temp:  [98.3 °F (36.8 °C)-99 °F (37.2 °C)] 98.3 °F (36.8 °C)  Pulse:  [80-89] 87  Resp:  [16-18] 16  SpO2:  [98 %-99 %] 98 %  BP: (109-123)/(70-81) 123/70    Abdomen: Soft, appropriately tender   Uterus: Firm, no fundal tenderness   Incision: Bandage in place with stable shadowing (marked)     Lab Review    Recent Labs   Lab 05/23/25  1835      K 3.7      CO2 22*   BUN 6   CREATININE 0.5   GLU 93   PROT 5.2*   BILITOT 0.3   ALKPHOS 120   ALT 7*   AST 18       Recent Labs   Lab 05/23/25  1127 05/24/25  0634   WBC 10.25 10.31   HGB 10.6* 7.5*   HCT 31.3* 23.1*   MCV 91 92    183       I/O  No intake or output data in the 24 hours ending 05/26/25 0646       Assessment and Plan:   Postpartum care:  - Patient doing well.  - Continue routine management and advances.    gHTN  - BP as above  - asymptomatic  - preE labs as above  - Mag: not indicated  - Hypertensive agent not indicatd at this time    Anemia  -  + 376 = 700mL  - CBC as above   - S/p IL Cytotec at time of delivery for uterine atony  - Asymptomatic  - iron/colace    Juliet Jessica MD  Obstetrics & Gynecology, PGY-1

## 2025-05-26 NOTE — PLAN OF CARE
Patient safety maintained, side rails up, bed low and locked position. Pt ambulating and voiding independently.  Pain well controlled with PRN pain medication. Fundus midline, firm, with moderate  lochia. Patient responding to infant cues.  Incision site dressing dry, and intact.  Significant other at bedside and assisting in patient's care.     Problem: Adult Inpatient Plan of Care  Goal: Plan of Care Review  Outcome: Progressing  Goal: Patient-Specific Goal (Individualized)  Outcome: Progressing  Goal: Absence of Hospital-Acquired Illness or Injury  Outcome: Progressing  Goal: Optimal Comfort and Wellbeing  Outcome: Progressing  Goal: Readiness for Transition of Care  Outcome: Progressing     Problem:  Fall Injury Risk  Goal: Absence of Fall, Infant Drop and Related Injury  Outcome: Progressing     Problem: Postpartum ( Delivery)  Goal: Successful Parent Role Transition  Outcome: Progressing  Goal: Hemostasis  Outcome: Progressing  Goal: Effective Bowel Elimination  Outcome: Progressing  Goal: Fluid and Electrolyte Balance  Outcome: Progressing  Goal: Absence of Infection Signs and Symptoms  Outcome: Progressing  Goal: Anesthesia/Sedation Recovery  Outcome: Progressing  Goal: Optimal Pain Control and Function  Outcome: Progressing  Goal: Nausea and Vomiting Relief  Outcome: Progressing  Goal: Effective Urinary Elimination  Outcome: Progressing  Goal: Effective Oxygenation and Ventilation  Outcome: Progressing     Problem: Infection  Goal: Absence of Infection Signs and Symptoms  Outcome: Progressing     Problem: Wound  Goal: Optimal Coping  Outcome: Progressing  Goal: Optimal Functional Ability  Outcome: Progressing  Goal: Absence of Infection Signs and Symptoms  Outcome: Progressing  Goal: Improved Oral Intake  Outcome: Progressing  Goal: Optimal Pain Control and Function  Outcome: Progressing  Goal: Skin Health and Integrity  Outcome: Progressing  Goal: Optimal Wound Healing  Outcome:  Progressing     Problem: Breastfeeding  Goal: Effective Breastfeeding  Outcome: Progressing     Problem: Skin Injury Risk Increased  Goal: Skin Health and Integrity  Outcome: Progressing     Problem: Anesthesia/Analgesia, Neuraxial  Goal: Safe, Effective Infusion Delivery  Outcome: Met  Goal: Absence of Infection Signs and Symptoms  Outcome: Met  Goal: Nausea and Vomiting Relief  Outcome: Met  Goal: Effective Pain Control  Outcome: Met  Goal: Effective Oxygenation and Ventilation  Outcome: Met  Goal: Baseline Motor Function  Outcome: Met  Goal: Effective Urinary Elimination  Outcome: Met

## 2025-05-26 NOTE — PLAN OF CARE
Copied from baby's chart         SOCIAL WORK DISCHARGE PLANNING ASSESSMENT     SW completed discharge planning assessment with pt's mother in mother's room 645.  Pt's mother was easily engaged. Education on the role of  was provided. Emotional support provided throughout assessment.        Legal Name: Chosen One Garcia         :  2026  [place] [place] [address]  Parent's Phone Numbers: Amena [phone]  Jordi [phone]     Pediatrician: mom cannot recall name at this time, will update staff tomorrow    Education: Information given on NICU Education Classes; Physician/NNP daily rounds; and Postpartum Depression signs.   Potential Eligibility for SSI Benefits: No        [Problem List]    [Problem List]  Patient Active Problem List      Diagnosis      infant of 35 completed weeks of gestation    Alteration in nutrition    Healthcare maintenance    SGA (small for gestational age)           Birth Hospital:[facility]           KARLEE: 25     Birth Weight:   1.82 kg (4 lb 0.2 oz)              Birth Length:                       Gestational Age: 35w1d           Apgars    Living status: Living  Apgar Component Scores:  1 min.:  5 min.:  10 min.:  15 min.:  20 min.:    Skin color:  0  1          Heart rate:  2  2          Reflex irritability:  2  2          Muscle tone:  2  2          Respiratory effort:  2  2          Total:  8  9          Apgars assigned by: NICU              25 0931   NICU Assessment   Assessment Type Discharge Planning Assessment   Source of Information family   Verified Demographic and Insurance Information Yes   Insurance Medicaid    Contact Status none needed   Lives With mother;father;grandmother;brother   Number people in home 5 including pt   Relationship Status of Parents In relationship   Primary Source of Support/Comfort parent   Other children (include names and ages) twin brother, Obinna   Mother  Employed No   Father's Involvement Fully Involved   Is Father signing the birth certificate Yes   Father Name and  Jordi Garcia   05   Family Involvement High   Other Contacts Names and Numbers Jakub [place] (pg) [phone]   Infant Feeding Plan formula feeding   Does baby have crib or safe sleep space? Yes   Do you have a car seat? Yes   Resource/Environmental Concerns none   Environment Concerns none   Current Resources Other  (WIC)   Resources/Education Provided [facility];Support Resources for NICU Families;My NICU Baby Tom;WIC;Post Partum Depression;Preparing for Your Baby's Discharge Home   DME Needed Upon Discharge  none   DCFS No indications (Indicators for Report)   Discharge Plan A Home with family

## 2025-05-27 VITALS
RESPIRATION RATE: 18 BRPM | HEART RATE: 88 BPM | SYSTOLIC BLOOD PRESSURE: 127 MMHG | DIASTOLIC BLOOD PRESSURE: 80 MMHG | OXYGEN SATURATION: 100 % | TEMPERATURE: 98 F

## 2025-05-27 PROBLEM — O36.5922: Status: RESOLVED | Noted: 2025-01-24 | Resolved: 2025-05-27

## 2025-05-27 PROBLEM — O30.033 MONOCHORIONIC DIAMNIOTIC TWIN GESTATION IN THIRD TRIMESTER: Status: RESOLVED | Noted: 2025-01-24 | Resolved: 2025-05-27

## 2025-05-27 LAB
ABO + RH BLD: NORMAL
BLD PROD TYP BPU: NORMAL
BLOOD UNIT EXPIRATION DATE: NORMAL
BLOOD UNIT TYPE CODE: 5100
CROSSMATCH INTERPRETATION: NORMAL
DISPENSE STATUS: NORMAL
UNIT NUMBER: NORMAL

## 2025-05-27 PROCEDURE — 99238 HOSP IP/OBS DSCHRG MGMT 30/<: CPT | Mod: ,,, | Performed by: OBSTETRICS & GYNECOLOGY

## 2025-05-27 PROCEDURE — 25000003 PHARM REV CODE 250

## 2025-05-27 RX ADMIN — DOCUSATE SODIUM 200 MG: 100 CAPSULE, LIQUID FILLED ORAL at 08:05

## 2025-05-27 RX ADMIN — IBUPROFEN 800 MG: 400 TABLET ORAL at 02:05

## 2025-05-27 RX ADMIN — ACETAMINOPHEN 650 MG: 325 TABLET, FILM COATED ORAL at 02:05

## 2025-05-27 RX ADMIN — IBUPROFEN 800 MG: 400 TABLET ORAL at 11:05

## 2025-05-27 RX ADMIN — FERROUS SULFATE TAB 325 MG (65 MG ELEMENTAL FE) 1 EACH: 325 (65 FE) TAB at 08:05

## 2025-05-27 RX ADMIN — PRENATAL VIT W/ FE FUMARATE-FA TAB 27-0.8 MG 1 TABLET: 27-0.8 TAB at 08:05

## 2025-05-27 RX ADMIN — ACETAMINOPHEN 650 MG: 325 TABLET, FILM COATED ORAL at 08:05

## 2025-05-27 RX ADMIN — DOCUSATE SODIUM 200 MG: 100 CAPSULE, LIQUID FILLED ORAL at 02:05

## 2025-05-27 NOTE — NURSING
The following message was sent to dr rosario's staff:  Hi, can you please call ms connell to schedule a 1week post partum bp check appt on or before tues 6/3. Thank you ,pt was dx w/gest HTN and does NOT have a bp cuff at home  As per MD note:  gHTN  - BP as above  - asymptomatic  - preE labs as above  - Mag: not indicated  - Hypertensive agent not indicated at this time

## 2025-05-27 NOTE — DISCHARGE SUMMARY
Delivery Discharge Summary  Obstetrics      Primary OB Clinician: Mitzi Nava MD     Admission date: 2025  Discharge date: 2025    Disposition: To home, self care    Discharge Diagnosis List:    Problem List[1]    Procedure: , due to scheduled pLTCS 2/2 Mo/Di Twins with FGR of Twin B    Hospital Course:  Amena Theodore is a 19 y.o. now , POD #4 who was admitted on 2025 at 35w1d for scheduled pLTCS. Patient was subsequently admitted to labor and delivery unit with signed consents.     Patient was taken to the OR and a pLTCS was performed and complicated by a delayed postpartum bleeding. She received rectal cytotec and Methergine with improvement in her bleeding. She developed ALBA, however declined a blood transfusion.  She was started on iron and colace in the postpartum period. Please see delivery note for further details.    Her postpartum course was also complicated by the development of gHTN. She did not require initiation of any antihypertensive medications while inpatient. On discharge day, patient's pain is controlled with oral pain medications. Pt is tolerating ambulation without SOB or CP, and regular diet without N/V. Reports lochia is mild. Denies any HA, vision changes, F/C, LE swelling. Denies any breast pain/soreness.    Pt in stable condition and ready for discharge. She has been instructed to start and/or continue medications and follow up with her obstetrics provider as listed below.    Pertinent studies:  CBC  Recent Labs   Lab 25  1127 25  0634   WBC 10.25 10.31   HGB 10.6* 7.5*   HCT 31.3* 23.1*   MCV 91 92    183        Immunization History   Administered Date(s) Administered    Tdap 2025           RIMA Theodore Boy Amena [[id number]]     Delivery:    Episiotomy:     Lacerations:     Repair suture:     Repair # of packets:     Blood loss (ml):       Birth information:  YOB: 2026   Time of birth: 12:44 PM   Sex: male  "  Delivery type: , Low Transverse   Gestational Age: 35w1d     Measurements    Weight: 2190 g  Weight (lbs): 4 lb 13.3 oz  Length: 43.2 cm  Length (in): 17"  Head circumference: 33.5 cm  Chest circumference: 27.8 cm         Delivery Clinician: Delivery Providers    Delivering clinician: Mitzi Nava MD   Provider Role    Hien Eng MD Gras, Bryce, Margo Hawley ST              Additional  information:  Forceps:    Vacuum:    Breech:    Observed anomalies      Living?:     Apgars    Living status: Living  Apgar Component Scores:  1 min.:  5 min.:  10 min.:  15 min.:  20 min.:    Skin color:  0  1       Heart rate:  2  2       Reflex irritability:  2  2       Muscle tone:  2  2       Respiratory effort:  2  2       Total:  8  9       Apgars assigned by: NICU         Placenta: Delivered:       appearance     KENNEDY Theodore [[id number]]     Delivery:    Episiotomy:     Lacerations:     Repair suture:     Repair # of packets:     Blood loss (ml):       Birth information:  YOB: 2026   Time of birth: 12:44 PM   Sex: male   Delivery type: , Low Transverse   Gestational Age: 35w1d     Measurements    Weight: 1820 g  Weight (lbs): 4 lb 0.2 oz  Length: 42.9 cm  Length (in): 16.89"         Delivery Clinician: Delivery Providers    Delivering clinician: Mitzi Nava MD   Provider Role    Hien Eng MD Gras, Bryce, Margo Hawley ST              Additional  information:  Forceps:    Vacuum:    Breech:    Observed anomalies      Living?:     Apgars    Living status: Living  Apgar Component Scores:  1 min.:  5 min.:  10 min.:  15 min.:  20 min.:    Skin color:  0  1       Heart rate:  2  2       Reflex irritability:  2  2       Muscle tone:  2  2       Respiratory effort:  2  2       Total:  8  9       Apgars assigned by: NICU         Placenta: Delivered:       appearance    Patient Instructions:   Current Discharge Medication List    "     START taking these medications    Details   acetaminophen (TYLENOL) 325 MG tablet Take 2 tablets (650 mg total) by mouth every 6 (six) hours.  Qty: 30 tablet, Refills: 1      docusate sodium (COLACE) 100 MG capsule Take 2 capsules (200 mg total) by mouth 2 (two) times daily.  Qty: 30 capsule, Refills: 1      ibuprofen (ADVIL,MOTRIN) 800 MG tablet Take 1 tablet (800 mg total) by mouth every 8 (eight) hours.  Qty: 30 tablet, Refills: 1      oxyCODONE (ROXICODONE) 5 MG immediate release tablet Take 1 tablet (5 mg total) by mouth every 4 (four) hours as needed for Pain.  Qty: 20 tablet, Refills: 0    Comments: Quantity prescribed more than 7 day supply? No  Associated Diagnoses: Status post  delivery           CONTINUE these medications which have NOT CHANGED    Details   ferrous sulfate 324 mg (65 mg iron) TbEC Take 1 tablet (324 mg total) by mouth every other day.  Qty: 45 tablet, Refills: 1    Associated Diagnoses: Anemia during pregnancy in second trimester      prenatal vit no.124/iron/folic (PRENATAL VITAMIN ORAL) Take by mouth.           STOP taking these medications       aspirin (ECOTRIN) 81 MG EC tablet Comments:   Reason for Stopping:               Discharge Procedure Orders   Diet Adult Regular     No driving until:   Order Comments: No driving until not taking narcotic pain medication.     Pelvic Rest   Order Comments: Pelvic rest until 6 weeks after discharge. Nothing in vagina -no sex, tampons, douching, etc.     Notify your health care provider if you experience any of the following:  temperature >100.4     Notify your health care provider if you experience any of the following:  persistent nausea and vomiting or diarrhea     Notify your health care provider if you experience any of the following:  severe uncontrolled pain     Notify your health care provider if you experience any of the following:  redness, tenderness, or signs of infection (pain, swelling, redness, odor or green/yellow  discharge around incision site)     Notify your health care provider if you experience any of the following:  difficulty breathing or increased cough     Notify your health care provider if you experience any of the following:  severe persistent headache     Notify your health care provider if you experience any of the following:  worsening rash     Notify your health care provider if you experience any of the following:  persistent dizziness, light-headedness, or visual disturbances     Notify your health care provider if you experience any of the following:  increased confusion or weakness     Notify your health care provider if you experience any of the following:   Order Comments: Heavy vaginal bleeding saturating more than 1 pad per hr for at least consecutive 2 hrs.     Activity as tolerated        Follow-up Information       Mitzi Nava MD Follow up.    Specialty: Obstetrics and Gynecology  Why: Blood pressure check  Contact information:  [address] [id number] [phone]               Mitzi Nava MD Follow up in 6 week(s).    Specialty: Obstetrics and Gynecology  Why: Post partum visit  Contact information:  [address] [id number] [phone]                              Ivonne Stallworth MD  OBGYN, PGY-4            [1]   Patient Active Problem List  Diagnosis    Maternal iron deficiency anemia affecting pregnancy in third trimester, antepartum    Status post  delivery    Anemia    Gestational hypertension    ABLA (acute blood loss anemia)

## 2025-05-27 NOTE — PROGRESS NOTES
POSTPARTUM PROGRESS NOTE    Subjective:     PPD/POD#: 4   Procedure: Primary LTCS ( Mo-Di twins)   EGA: 35w1d   N/V: No   F/C: No   Abd Pain: Mild, well-controlled with oral pain medication   Lochia: Mild   Voiding: Yes   Ambulating: Yes   Bowel fnc: Yes   Contraception: Per primary OB     Patient reports that she is feeling well this AM. She reports that her pain is well controlled with PO pain medications. Denies any lightheadedness, dizziness, or SOB with ambulation.     Objective:      Temp:  [98.4 °F (36.9 °C)-98.6 °F (37 °C)] 98.6 °F (37 °C)  Pulse:  [] 103  Resp:  [18] 18  SpO2:  [96 %-98 %] 96 %  BP: (113-125)/(68-80) 113/68    Abdomen: Soft, appropriately tender   Uterus: Firm, no fundal tenderness   Incision: Bandage in place with stable moderate shadowing     Lab Review    Recent Labs   Lab 05/23/25  1835      K 3.7      CO2 22*   BUN 6   CREATININE 0.5   GLU 93   PROT 5.2*   BILITOT 0.3   ALKPHOS 120   ALT 7*   AST 18       Recent Labs   Lab 05/23/25  1127 05/24/25  0634   WBC 10.25 10.31   HGB 10.6* 7.5*   HCT 31.3* 23.1*   MCV 91 92    183       I/O  No intake or output data in the 24 hours ending 05/27/25 0650       Assessment and Plan:   Postpartum care:  - Patient doing well.  - Continue routine management and advances.    gHTN  - BP as above  - asymptomatic  - preE labs as above  - Mag: not indicated  - Hypertensive agent not indicated at this time    Anemia  -  + 376 = 700mL  - CBC as above   - S/p AR Cytotec at time of delivery for uterine atony  - Asymptomatic  - iron/colace    Juliet Jessica MD  Obstetrics & Gynecology, PGY-1

## 2025-05-28 ENCOUNTER — PATIENT MESSAGE (OUTPATIENT)
Dept: OBSTETRICS AND GYNECOLOGY | Facility: OTHER | Age: 20
End: 2025-05-28
Payer: MEDICAID

## 2025-05-28 LAB
ESTROGEN SERPL-MCNC: NORMAL PG/ML
INSULIN SERPL-ACNC: NORMAL U[IU]/ML
LAB AP GROSS DESCRIPTION: NORMAL
LAB AP PERFORMING LOCATION(S): NORMAL
LAB AP REPORT FOOTNOTES: NORMAL

## 2025-06-13 ENCOUNTER — OFFICE VISIT (OUTPATIENT)
Dept: OBSTETRICS AND GYNECOLOGY | Facility: CLINIC | Age: 20
End: 2025-06-13
Payer: MEDICAID

## 2025-06-13 DIAGNOSIS — Z98.891 STATUS POST CESAREAN DELIVERY: Primary | ICD-10-CM

## 2025-06-13 NOTE — PATIENT INSTRUCTIONS
Birth Certificate: [phone]    Local support:   Breastfeeding support: [employer]     [place] [employer] and [employer] [place] [address] [facility]. [email or web address] [phone] (ext 2000)    Snuggles and struggles:  Free and open to the public, call [phone] or email [email or web address] for information     Mom to Mom:  [email or web address]    Cafe Au Lait:  [email or web address].    Get education and online support at [email or web address].net  Talk @ [phone]   Text @ [phone]

## 2025-06-13 NOTE — PROGRESS NOTES
The patient location is: [place]  The chief complaint leading to consultation is: mood check    Visit type: audiovisual  S/p 1LTCS for twins  Boys are doing well -one is still in NICU, one is at home  Pain well controlled  Bleeding minimal  Normal urination and bowels  No breast issues  Doing well from anxiety/depression standpoint. Obviously some sadness when having to leave one at the nicu, but getting closer to discharge!   Has good support at home  Reviewed contraceptive options: OCP to start at routine pp visit     Precautions reviewed     Face to Face time with patient: 10  15 minutes of total time spent on the encounter, which includes face to face time and non-face to face time preparing to see the patient (eg, review of tests), Obtaining and/or reviewing separately obtained history, Documenting clinical information in the electronic or other health record, Independently interpreting results (not separately reported) and communicating results to the patient/family/caregiver, or Care coordination (not separately reported).         Each patient to whom he or she provides medical services by telemedicine is:  (1) informed of the relationship between the physician and patient and the respective role of any other health care provider with respect to management of the patient; and (2) notified that he or she may decline to receive medical services by telemedicine and may withdraw from such care at any time.    Notes:

## 2025-06-20 RX ORDER — NORELGESTROMIN AND ETHINYL ESTRADIOL 35; 150 UG/MG; UG/MG
1 PATCH TRANSDERMAL
Qty: 12 PATCH | Refills: 3 | Status: SHIPPED | OUTPATIENT
Start: 2025-06-20 | End: 2026-06-20

## 2025-07-15 ENCOUNTER — POSTPARTUM VISIT (OUTPATIENT)
Dept: OBSTETRICS AND GYNECOLOGY | Facility: CLINIC | Age: 20
End: 2025-07-15
Payer: MEDICAID

## 2025-07-15 VITALS
BODY MASS INDEX: 20.62 KG/M2 | DIASTOLIC BLOOD PRESSURE: 64 MMHG | WEIGHT: 116.38 LBS | SYSTOLIC BLOOD PRESSURE: 112 MMHG

## 2025-07-15 DIAGNOSIS — Z98.891 STATUS POST CESAREAN DELIVERY: Primary | ICD-10-CM

## 2025-07-15 PROCEDURE — 99213 OFFICE O/P EST LOW 20 MIN: CPT | Mod: PBBFAC | Performed by: STUDENT IN AN ORGANIZED HEALTH CARE EDUCATION/TRAINING PROGRAM

## 2025-07-15 PROCEDURE — 88175 CYTOPATH C/V AUTO FLUID REDO: CPT | Mod: TC | Performed by: STUDENT IN AN ORGANIZED HEALTH CARE EDUCATION/TRAINING PROGRAM

## 2025-07-15 PROCEDURE — 99999 PR PBB SHADOW E&M-EST. PATIENT-LVL III: CPT | Mod: PBBFAC,,, | Performed by: STUDENT IN AN ORGANIZED HEALTH CARE EDUCATION/TRAINING PROGRAM

## 2025-07-15 NOTE — PROGRESS NOTES
[facility]  Obstetrics & Gynecology      History of Present Illness:   Amena Theodore is here for her postaprtum visit after 1LTCS for mono/di twins. Delivery and postpartum course was uncomplicated. Pregnancy was complicated by FGR of twin B, Week D Ab, RNI, anemia. She is feeling well today. She denies bleeding, pain, F/C, N/V. Normal bowel and bladder function. No breast issues. Has not had sex since the delivery. She denies si/sx of PPD.  Baby is doing well. Contraceptive plans patch.        Current Outpatient Medications on File Prior to Visit   Medication Sig    acetaminophen (TYLENOL) 325 MG tablet Take 2 tablets (650 mg total) by mouth every 6 (six) hours.    docusate sodium (COLACE) 100 MG capsule Take 2 capsules (200 mg total) by mouth 2 (two) times daily.    ferrous sulfate 324 mg (65 mg iron) TbEC Take 1 tablet (324 mg total) by mouth every other day.    ibuprofen (ADVIL,MOTRIN) 800 MG tablet Take 1 tablet (800 mg total) by mouth every 8 (eight) hours.    norelgestromin-ethinyl estradiol 150-35 mcg/24 hr Place 1 patch onto the skin every 7 days.    oxyCODONE (ROXICODONE) 5 MG immediate release tablet Take 1 tablet (5 mg total) by mouth every 4 (four) hours as needed for Pain.    prenatal vit no.124/iron/folic (PRENATAL VITAMIN ORAL) Take by mouth.     No current facility-administered medications on file prior to visit.       Review of patient's allergies indicates:  No Known Allergies    No past medical history on file.  OB History    Para Term  AB Living   1 1 0 1 0 2   SAB IAB Ectopic Multiple Live Births   0 0 0 1 2      # Outcome Date GA Lbr Juan José/2nd Weight Sex Type Anes PTL Lv   1A  25 35w1d  2.19 kg (4 lb 13.3 oz) M CS-LTranv Spinal Y RADHA      Name: Obinna Tavarez Garcia      Apgar1: 8  Apgar5: 9   1B  25 35w1d  1.82 kg (4 lb 0.2 oz) M CS-LTranv Spinal Y RADHA      Name: [place]      Apgar1: 8  Apgar5: 9     Past Surgical History:   Procedure Laterality  Date     SECTION N/A 2025    Procedure:  SECTION;  Surgeon: Mitzi Nava MD;  [facility];  Service: OB/GYN;  Laterality: N/A;     Family History       Problem Relation (Age of Onset)    Diabetes Maternal Aunt          Tobacco Use    Smoking status: Never    Smokeless tobacco: Never   Substance and Sexual Activity    Alcohol use: Not Currently    Drug use: Not Currently    Sexual activity: Yes     Partners: Male     Review of Systems  Objective:     Vital Signs (Most Recent):    Vital Signs (24h Range):  [unfilled]        There is no height or weight on file to calculate BMI.  Patient's last menstrual period was 2024.    Physical Exam    Lab Results   Component Value Date    WBC 10.31 2025    HGB 7.5 (L) 2025    HCT 23.1 (L) 2025    MCV 92 2025     2025           Assessment/Plan:   Assessment and Plan:  Pt is doing well postpartum. No complaints.  Incision healing well  PPD screen negative  T2DM screen not indicated  Pap collected and GC/CT  CBC ordered   Contraceptive plans: Patch  Pt is cleared for all activity    RTC for annual or PRN    Mitzi Nava MD  Obstetrics & Gynecology  [facility]

## 2025-07-17 NOTE — PATIENT INSTRUCTIONS
Local support:   [place] [employer] and [employer] [place] [address] [email or web address] [phone] (ext 2000)    Snuggles and struggles:  Free and open to the public, call [phone] or email [email or web address] for information     Mom to Mom:  [email or web address]    Cafe Au Lait:  [email or web address].    Get education and online support at postpartum.net  Talk @ [phone]   Text @ [phone]

## 2025-07-18 LAB
INSULIN SERPL-ACNC: NORMAL U[IU]/ML
LAB AP BETHESDA CATEGORY: NORMAL
LAB AP CLINICAL FINDINGS: NORMAL
LAB AP CONTRACEPTIVES: NORMAL
LAB AP GYN ADDITIONAL FINDINGS: NORMAL
LAB AP LMP DATE: NORMAL
LAB AP OCHS PAP SPECIMEN ADEQUACY: NORMAL
LAB AP OHS PAP INTERPRETATION: NORMAL
LAB AP PAP DISCLAIMER COMMENTS: NORMAL
LAB AP PAP ESTROGEN REPLACEMENT THERAPY: NORMAL
LAB AP PAP PMP: NORMAL
LAB AP PAP PREVIOUS BX: NORMAL
LAB AP PAP PRIOR TREATMENT: NORMAL
LAB AP PERFORMING LOCATION(S): NORMAL

## 2025-07-22 ENCOUNTER — TELEPHONE (OUTPATIENT)
Dept: OBSTETRICS AND GYNECOLOGY | Facility: CLINIC | Age: 20
End: 2025-07-22
Payer: MEDICAID

## 2025-07-22 NOTE — TELEPHONE ENCOUNTER
Returned pt call and let her know that she has tested positive for an STD and that a prescription was sent in to the pharmacy for both her and her partner.   Advised yo abstain from intercourse for 2 weeks while the antibiotics start to work and to consider condom use to avoid repeat exposure and to notify any previous partners from the  last 90 days.   Pt was a bit confused in what this is and explained that it is a sexually transmitted disease, pt verbalized understanding and will be getting the prescription today.

## 2025-07-22 NOTE — TELEPHONE ENCOUNTER
Copied from CRM #[id number]. Topic: General Inquiry - Patient Advice  >> Jul 22, 2025  8:13 AM Mimi wrote:  Patient is calling for Medical Advice regarding:Missed call     Patient wants a call back or thru [facility]:Call back     Comments:Pt states she missed a call from nurse on yesterday pt would like a call back     Please advise patient replies from provider may take up to 48 hours.   Dupixent Pregnancy And Lactation Text: This medication likely crosses the placenta but the risk for the fetus is uncertain. This medication is excreted in breast milk.